# Patient Record
Sex: FEMALE | Race: WHITE | Employment: OTHER | URBAN - METROPOLITAN AREA
[De-identification: names, ages, dates, MRNs, and addresses within clinical notes are randomized per-mention and may not be internally consistent; named-entity substitution may affect disease eponyms.]

---

## 2019-06-12 ENCOUNTER — APPOINTMENT (OUTPATIENT)
Dept: GENERAL RADIOLOGY | Age: 59
End: 2019-06-12
Payer: MEDICARE

## 2019-06-12 ENCOUNTER — HOSPITAL ENCOUNTER (OUTPATIENT)
Age: 59
Setting detail: OBSERVATION
Discharge: HOME | End: 2019-06-13
Attending: EMERGENCY MEDICINE | Admitting: INTERNAL MEDICINE
Payer: MEDICARE

## 2019-06-12 DIAGNOSIS — R07.9 CHEST PAIN, UNSPECIFIED TYPE: Primary | ICD-10-CM

## 2019-06-12 DIAGNOSIS — R00.2 PALPITATIONS: ICD-10-CM

## 2019-06-12 LAB
A/G RATIO: 1.3 (ref 1.1–2.2)
ALBUMIN SERPL-MCNC: 4.3 G/DL (ref 3.4–5)
ALP BLD-CCNC: 73 U/L (ref 40–129)
ALT SERPL-CCNC: 11 U/L (ref 10–40)
ANION GAP SERPL CALCULATED.3IONS-SCNC: 11 MMOL/L (ref 3–16)
AST SERPL-CCNC: 14 U/L (ref 15–37)
BASOPHILS ABSOLUTE: 0.1 K/UL (ref 0–0.2)
BASOPHILS RELATIVE PERCENT: 0.7 %
BILIRUB SERPL-MCNC: 0.4 MG/DL (ref 0–1)
BUN BLDV-MCNC: 20 MG/DL (ref 7–20)
CALCIUM SERPL-MCNC: 9.5 MG/DL (ref 8.3–10.6)
CHLORIDE BLD-SCNC: 109 MMOL/L (ref 99–110)
CO2: 21 MMOL/L (ref 21–32)
CREAT SERPL-MCNC: 0.8 MG/DL (ref 0.6–1.1)
D DIMER: 593 NG/ML DDU (ref 0–229)
EOSINOPHILS ABSOLUTE: 0.2 K/UL (ref 0–0.6)
EOSINOPHILS RELATIVE PERCENT: 1.8 %
GFR AFRICAN AMERICAN: >60
GFR NON-AFRICAN AMERICAN: >60
GLOBULIN: 3.2 G/DL
GLUCOSE BLD-MCNC: 104 MG/DL (ref 70–99)
HCT VFR BLD CALC: 42.5 % (ref 36–48)
HEMOGLOBIN: 14.2 G/DL (ref 12–16)
LYMPHOCYTES ABSOLUTE: 3.4 K/UL (ref 1–5.1)
LYMPHOCYTES RELATIVE PERCENT: 28.9 %
MAGNESIUM: 2.7 MG/DL (ref 1.8–2.4)
MCH RBC QN AUTO: 30.9 PG (ref 26–34)
MCHC RBC AUTO-ENTMCNC: 33.5 G/DL (ref 31–36)
MCV RBC AUTO: 92.5 FL (ref 80–100)
MONOCYTES ABSOLUTE: 0.9 K/UL (ref 0–1.3)
MONOCYTES RELATIVE PERCENT: 7.5 %
NEUTROPHILS ABSOLUTE: 7.1 K/UL (ref 1.7–7.7)
NEUTROPHILS RELATIVE PERCENT: 61.1 %
PDW BLD-RTO: 13.4 % (ref 12.4–15.4)
PLATELET # BLD: 307 K/UL (ref 135–450)
PMV BLD AUTO: 7.8 FL (ref 5–10.5)
POTASSIUM REFLEX MAGNESIUM: 4.5 MMOL/L (ref 3.5–5.1)
RBC # BLD: 4.59 M/UL (ref 4–5.2)
SODIUM BLD-SCNC: 141 MMOL/L (ref 136–145)
TOTAL PROTEIN: 7.5 G/DL (ref 6.4–8.2)
TROPONIN: <0.01 NG/ML
TROPONIN: <0.01 NG/ML
WBC # BLD: 11.6 K/UL (ref 4–11)

## 2019-06-12 PROCEDURE — 36415 COLL VENOUS BLD VENIPUNCTURE: CPT

## 2019-06-12 PROCEDURE — 93005 ELECTROCARDIOGRAM TRACING: CPT | Performed by: EMERGENCY MEDICINE

## 2019-06-12 PROCEDURE — G0378 HOSPITAL OBSERVATION PER HR: HCPCS

## 2019-06-12 PROCEDURE — 99285 EMERGENCY DEPT VISIT HI MDM: CPT

## 2019-06-12 PROCEDURE — 71046 X-RAY EXAM CHEST 2 VIEWS: CPT

## 2019-06-12 PROCEDURE — 85025 COMPLETE CBC W/AUTO DIFF WBC: CPT

## 2019-06-12 PROCEDURE — 83735 ASSAY OF MAGNESIUM: CPT

## 2019-06-12 PROCEDURE — 80053 COMPREHEN METABOLIC PANEL: CPT

## 2019-06-12 PROCEDURE — 85379 FIBRIN DEGRADATION QUANT: CPT

## 2019-06-12 PROCEDURE — 6370000000 HC RX 637 (ALT 250 FOR IP): Performed by: EMERGENCY MEDICINE

## 2019-06-12 PROCEDURE — 84484 ASSAY OF TROPONIN QUANT: CPT

## 2019-06-12 RX ORDER — NITROGLYCERIN 0.4 MG/1
0.4 TABLET SUBLINGUAL EVERY 5 MIN PRN
Status: DISCONTINUED | OUTPATIENT
Start: 2019-06-12 | End: 2019-06-13 | Stop reason: HOSPADM

## 2019-06-12 RX ORDER — NAPROXEN 250 MG/1
500 TABLET ORAL 2 TIMES DAILY WITH MEALS
Status: DISCONTINUED | OUTPATIENT
Start: 2019-06-13 | End: 2019-06-12

## 2019-06-12 RX ORDER — NAPROXEN 250 MG/1
500 TABLET ORAL 2 TIMES DAILY PRN
Status: DISCONTINUED | OUTPATIENT
Start: 2019-06-12 | End: 2019-06-13 | Stop reason: HOSPADM

## 2019-06-12 RX ORDER — HYDROCHLOROTHIAZIDE 25 MG/1
25 TABLET ORAL DAILY
Status: DISCONTINUED | OUTPATIENT
Start: 2019-06-13 | End: 2019-06-13 | Stop reason: HOSPADM

## 2019-06-12 RX ORDER — TOPIRAMATE 100 MG/1
100 TABLET, FILM COATED ORAL NIGHTLY
Status: DISCONTINUED | OUTPATIENT
Start: 2019-06-12 | End: 2019-06-13 | Stop reason: HOSPADM

## 2019-06-12 RX ORDER — POTASSIUM CHLORIDE 7.45 MG/ML
10 INJECTION INTRAVENOUS PRN
Status: DISCONTINUED | OUTPATIENT
Start: 2019-06-12 | End: 2019-06-13 | Stop reason: HOSPADM

## 2019-06-12 RX ORDER — POTASSIUM CHLORIDE 750 MG/1
10 TABLET, EXTENDED RELEASE ORAL
Status: DISCONTINUED | OUTPATIENT
Start: 2019-06-13 | End: 2019-06-13 | Stop reason: HOSPADM

## 2019-06-12 RX ORDER — SODIUM CHLORIDE 0.9 % (FLUSH) 0.9 %
10 SYRINGE (ML) INJECTION PRN
Status: DISCONTINUED | OUTPATIENT
Start: 2019-06-12 | End: 2019-06-13 | Stop reason: HOSPADM

## 2019-06-12 RX ORDER — POTASSIUM CHLORIDE 20 MEQ/1
40 TABLET, EXTENDED RELEASE ORAL PRN
Status: DISCONTINUED | OUTPATIENT
Start: 2019-06-12 | End: 2019-06-13 | Stop reason: HOSPADM

## 2019-06-12 RX ORDER — ACETAMINOPHEN 325 MG/1
650 TABLET ORAL EVERY 4 HOURS PRN
Status: DISCONTINUED | OUTPATIENT
Start: 2019-06-12 | End: 2019-06-13 | Stop reason: HOSPADM

## 2019-06-12 RX ORDER — GABAPENTIN 100 MG/1
100 CAPSULE ORAL 3 TIMES DAILY
Status: DISCONTINUED | OUTPATIENT
Start: 2019-06-12 | End: 2019-06-13 | Stop reason: HOSPADM

## 2019-06-12 RX ORDER — ASPIRIN 325 MG
325 TABLET ORAL ONCE
Status: COMPLETED | OUTPATIENT
Start: 2019-06-12 | End: 2019-06-12

## 2019-06-12 RX ORDER — MECLIZINE HCL 12.5 MG/1
25 TABLET ORAL 3 TIMES DAILY
Status: DISCONTINUED | OUTPATIENT
Start: 2019-06-12 | End: 2019-06-13 | Stop reason: HOSPADM

## 2019-06-12 RX ORDER — MAGNESIUM SULFATE 1 G/100ML
1 INJECTION INTRAVENOUS PRN
Status: DISCONTINUED | OUTPATIENT
Start: 2019-06-12 | End: 2019-06-13 | Stop reason: HOSPADM

## 2019-06-12 RX ORDER — ONDANSETRON 2 MG/ML
4 INJECTION INTRAMUSCULAR; INTRAVENOUS EVERY 6 HOURS PRN
Status: DISCONTINUED | OUTPATIENT
Start: 2019-06-12 | End: 2019-06-13 | Stop reason: HOSPADM

## 2019-06-12 RX ORDER — SODIUM CHLORIDE 0.9 % (FLUSH) 0.9 %
10 SYRINGE (ML) INJECTION EVERY 12 HOURS SCHEDULED
Status: DISCONTINUED | OUTPATIENT
Start: 2019-06-12 | End: 2019-06-13 | Stop reason: HOSPADM

## 2019-06-12 RX ORDER — ATORVASTATIN CALCIUM 10 MG/1
40 TABLET, FILM COATED ORAL NIGHTLY
Status: DISCONTINUED | OUTPATIENT
Start: 2019-06-12 | End: 2019-06-13 | Stop reason: HOSPADM

## 2019-06-12 RX ORDER — PROMETHAZINE HYDROCHLORIDE 25 MG/1
25 TABLET ORAL EVERY 6 HOURS PRN
Status: DISCONTINUED | OUTPATIENT
Start: 2019-06-12 | End: 2019-06-13 | Stop reason: HOSPADM

## 2019-06-12 RX ORDER — PANTOPRAZOLE SODIUM 40 MG/1
40 TABLET, DELAYED RELEASE ORAL
Status: DISCONTINUED | OUTPATIENT
Start: 2019-06-13 | End: 2019-06-13 | Stop reason: HOSPADM

## 2019-06-12 RX ORDER — ASPIRIN 81 MG/1
81 TABLET, CHEWABLE ORAL DAILY
Status: DISCONTINUED | OUTPATIENT
Start: 2019-06-13 | End: 2019-06-13 | Stop reason: HOSPADM

## 2019-06-12 RX ADMIN — ASPIRIN 325 MG: 325 TABLET, COATED ORAL at 20:51

## 2019-06-12 ASSESSMENT — ENCOUNTER SYMPTOMS
COLOR CHANGE: 0
CHEST TIGHTNESS: 1
VOMITING: 0
SHORTNESS OF BREATH: 1
ABDOMINAL PAIN: 0
NAUSEA: 0
EYES NEGATIVE: 1
BACK PAIN: 0

## 2019-06-12 ASSESSMENT — PAIN SCALES - GENERAL: PAINLEVEL_OUTOF10: 7

## 2019-06-12 ASSESSMENT — HEART SCORE
ECG: 0
ECG: 1

## 2019-06-13 ENCOUNTER — APPOINTMENT (OUTPATIENT)
Dept: CT IMAGING | Age: 59
End: 2019-06-13
Payer: MEDICARE

## 2019-06-13 ENCOUNTER — APPOINTMENT (OUTPATIENT)
Dept: NUCLEAR MEDICINE | Age: 59
End: 2019-06-13
Payer: MEDICARE

## 2019-06-13 VITALS
OXYGEN SATURATION: 95 % | RESPIRATION RATE: 16 BRPM | BODY MASS INDEX: 24.4 KG/M2 | DIASTOLIC BLOOD PRESSURE: 80 MMHG | SYSTOLIC BLOOD PRESSURE: 138 MMHG | HEART RATE: 51 BPM | WEIGHT: 142.13 LBS | TEMPERATURE: 97.8 F

## 2019-06-13 LAB
EKG ATRIAL RATE: 52 BPM
EKG ATRIAL RATE: 65 BPM
EKG DIAGNOSIS: NORMAL
EKG DIAGNOSIS: NORMAL
EKG P AXIS: 34 DEGREES
EKG P AXIS: 57 DEGREES
EKG P-R INTERVAL: 178 MS
EKG P-R INTERVAL: 188 MS
EKG Q-T INTERVAL: 382 MS
EKG Q-T INTERVAL: 462 MS
EKG QRS DURATION: 76 MS
EKG QRS DURATION: 80 MS
EKG QTC CALCULATION (BAZETT): 397 MS
EKG QTC CALCULATION (BAZETT): 429 MS
EKG R AXIS: 24 DEGREES
EKG R AXIS: 37 DEGREES
EKG T AXIS: 24 DEGREES
EKG T AXIS: 33 DEGREES
EKG VENTRICULAR RATE: 52 BPM
EKG VENTRICULAR RATE: 65 BPM
LV EF: 65 %
LVEF MODALITY: NORMAL

## 2019-06-13 PROCEDURE — 2580000003 HC RX 258: Performed by: INTERNAL MEDICINE

## 2019-06-13 PROCEDURE — 78452 HT MUSCLE IMAGE SPECT MULT: CPT

## 2019-06-13 PROCEDURE — 3430000000 HC RX DIAGNOSTIC RADIOPHARMACEUTICAL: Performed by: INTERNAL MEDICINE

## 2019-06-13 PROCEDURE — 6360000002 HC RX W HCPCS: Performed by: INTERNAL MEDICINE

## 2019-06-13 PROCEDURE — 93005 ELECTROCARDIOGRAM TRACING: CPT | Performed by: INTERNAL MEDICINE

## 2019-06-13 PROCEDURE — 71260 CT THORAX DX C+: CPT

## 2019-06-13 PROCEDURE — G0378 HOSPITAL OBSERVATION PER HR: HCPCS

## 2019-06-13 PROCEDURE — 93017 CV STRESS TEST TRACING ONLY: CPT

## 2019-06-13 PROCEDURE — A9502 TC99M TETROFOSMIN: HCPCS | Performed by: INTERNAL MEDICINE

## 2019-06-13 PROCEDURE — 93010 ELECTROCARDIOGRAM REPORT: CPT | Performed by: INTERNAL MEDICINE

## 2019-06-13 PROCEDURE — 6360000004 HC RX CONTRAST MEDICATION: Performed by: REGISTERED NURSE

## 2019-06-13 PROCEDURE — 99204 OFFICE O/P NEW MOD 45 MIN: CPT | Performed by: INTERNAL MEDICINE

## 2019-06-13 PROCEDURE — 96372 THER/PROPH/DIAG INJ SC/IM: CPT

## 2019-06-13 PROCEDURE — 6370000000 HC RX 637 (ALT 250 FOR IP): Performed by: INTERNAL MEDICINE

## 2019-06-13 RX ADMIN — TOPIRAMATE 100 MG: 100 TABLET, FILM COATED ORAL at 00:17

## 2019-06-13 RX ADMIN — PANTOPRAZOLE SODIUM 40 MG: 40 TABLET, DELAYED RELEASE ORAL at 14:28

## 2019-06-13 RX ADMIN — TETROFOSMIN 11.3 MILLICURIE: 1.38 INJECTION, POWDER, LYOPHILIZED, FOR SOLUTION INTRAVENOUS at 11:50

## 2019-06-13 RX ADMIN — TETROFOSMIN 32.8 MILLICURIE: 1.38 INJECTION, POWDER, LYOPHILIZED, FOR SOLUTION INTRAVENOUS at 13:25

## 2019-06-13 RX ADMIN — GABAPENTIN 100 MG: 100 CAPSULE ORAL at 14:28

## 2019-06-13 RX ADMIN — ENOXAPARIN SODIUM 40 MG: 40 INJECTION SUBCUTANEOUS at 08:25

## 2019-06-13 RX ADMIN — ASPIRIN 81 MG 81 MG: 81 TABLET ORAL at 08:25

## 2019-06-13 RX ADMIN — SODIUM CHLORIDE, PRESERVATIVE FREE 10 ML: 5 INJECTION INTRAVENOUS at 00:17

## 2019-06-13 RX ADMIN — POTASSIUM CHLORIDE 10 MEQ: 750 TABLET, EXTENDED RELEASE ORAL at 08:27

## 2019-06-13 RX ADMIN — MECLIZINE 25 MG: 12.5 TABLET ORAL at 08:27

## 2019-06-13 RX ADMIN — ACETAMINOPHEN 650 MG: 325 TABLET ORAL at 04:08

## 2019-06-13 RX ADMIN — ATORVASTATIN CALCIUM 40 MG: 10 TABLET, FILM COATED ORAL at 00:16

## 2019-06-13 RX ADMIN — SODIUM CHLORIDE, PRESERVATIVE FREE 10 ML: 5 INJECTION INTRAVENOUS at 08:27

## 2019-06-13 RX ADMIN — IOPAMIDOL 75 ML: 755 INJECTION, SOLUTION INTRAVENOUS at 16:56

## 2019-06-13 RX ADMIN — GABAPENTIN 100 MG: 100 CAPSULE ORAL at 08:25

## 2019-06-13 RX ADMIN — MECLIZINE 25 MG: 12.5 TABLET ORAL at 14:28

## 2019-06-13 RX ADMIN — GABAPENTIN 100 MG: 100 CAPSULE ORAL at 00:16

## 2019-06-13 RX ADMIN — HYDROCHLOROTHIAZIDE 25 MG: 25 TABLET ORAL at 08:30

## 2019-06-13 RX ADMIN — MECLIZINE 25 MG: 12.5 TABLET ORAL at 00:17

## 2019-06-13 ASSESSMENT — PAIN SCALES - GENERAL
PAINLEVEL_OUTOF10: 4
PAINLEVEL_OUTOF10: 4

## 2019-06-13 NOTE — ED PROVIDER NOTES
[address] [facility]  ED  EMERGENCY DEPARTMENT ENCOUNTER        Pt Name: Brigitte Carrillo  MRN: [id number]  [place] 1960  Date of evaluation: 6/12/2019  Provider: Nasir Dejesus PA-C  PCP: David Miranda MD  ED Attending: Dm Rojas DO      This patient was seen by the attending provider Dm Rojas DO    History provided by the patient    CHIEF COMPLAINT:     Chief Complaint   Patient presents with    Irregular Heart Beat     pt reports history of A. Fib but is not in all the time, today reports \"feeling like I was in A. Fib every time I stood up\". Pt also reports pain in bilateral arms and legs; sent in by         HISTORY OF PRESENT ILLNESS:      Brigitte Carrillo is a 64 y.o. female who arrives to the ED by private vehicle. The patient states since getting up this morning at 10 AM she has been experiencing intermittent chest tightness as well as tightness to bilateral upper and lower extremities. She reports she feels her heart racing at times. She is never actually checked to see how fast her heart is beating. The patient symptoms seem to be exacerbated when she is up and moving around and alleviated at rest.  She did call her PCP who based on the symptoms recommended she come to the emergency department. The patient reports a history of \"arrhythmia\". Initially the triage nurse documented a history of atrial fibrillation but the patient actually does not have this. She last had a stress test in 2015 at the [facility] that was reportedly normal.  The patient is a smoker. She has a history of hypertension and is prescribed hydrochlorothiazide. She does not have any further significant past medical history. She denies any recent travel, surgery or other period of immobility. She denies leg swelling. She is not on any estrogen supplements.     Nursing Notes were reviewed     REVIEW OF SYSTEMS:     Review of Systems   Constitutional: Negative for Factors: > 3 Risk factors or history of atherosclerotic disease*  Troponin: < 1X normal limit  Heart Score Total: 5      PHYSICAL EXAM:       ED Triage Vitals [06/12/19 1849]   BP Temp Temp Source Pulse Resp SpO2 Height Weight   122/81 97.5 °F (36.4 °C) Oral 85 20 96 % -- 145 lb (65.8 kg)       Physical Exam    CONSTITUTIONAL: Awake and alert. Cooperative. Well-developed. Well-nourished. Non-toxic. No acute distress. HENT: Normocephalic. Atraumatic. External ears normal, without discharge. No nasal discharge. Oropharynx clear. Mucous membranes moist.  EYES: Conjunctiva non-injected. No scleral icterus. PERRL. EOM's grossly intact. NECK: Supple. Normal ROM. CARDIOVASCULAR: RRR. No Murmer. Intact distal pulses. PULMONARY/CHEST WALL: Effort normal. No tachypnea. Lungs clear to ausculation. ABDOMEN: Normal BS. Soft. Nondistended. No tenderness to palpate. No guarding. /ANORECTAL: Not assessed  MUSKULOSKELETAL: Normal ROM. No acute deformities. No edema. No tenderness to palpate. SKIN: Warm and dry. No rash. NEUROLOGICAL: Alert and oriented x 3. GCS 15. CN II-XII grossly intact. Strength is 5/5 in all extremities and sensation is intact. Normal gait.    PSYCHIATRIC: Normal affect        DIAGNOSTICRESULTS:     LABS:    Results for orders placed or performed during the hospital encounter of 06/12/19   CBC Auto Differential   Result Value Ref Range    WBC 11.6 (H) 4.0 - 11.0 K/uL    RBC 4.59 4.00 - 5.20 M/uL    Hemoglobin 14.2 12.0 - 16.0 g/dL    Hematocrit 42.5 36.0 - 48.0 %    MCV 92.5 80.0 - 100.0 fL    MCH 30.9 26.0 - 34.0 pg    MCHC 33.5 31.0 - 36.0 g/dL    RDW 13.4 12.4 - 15.4 %    Platelets 083 809 - 550 K/uL    MPV 7.8 5.0 - 10.5 fL    Neutrophils % 61.1 %    Lymphocytes % 28.9 %    Monocytes % 7.5 %    Eosinophils % 1.8 %    Basophils % 0.7 %    Neutrophils # 7.1 1.7 - 7.7 K/uL    Lymphocytes # 3.4 1.0 - 5.1 K/uL    Monocytes # 0.9 0.0 - 1.3 K/uL    Eosinophils # 0.2 0.0 - 0.6 K/uL    Basophils # 0.1 unspecified type    2.  Palpitations          DISPOSITION/PLAN:   DISPOSITION Decision To Admit                 (Please note thatportions of this note were completed with a voice recognition program.  Efforts were made to edit the dictations, but occasionally words are mis-transcribed.)    Alfredo Arriaga PA-C (electronicallysigned)              [place], [place]  06/12/19 3730

## 2019-06-13 NOTE — CONSULTS
[id number]    Atypical chest pain  Palpitations  Smoker  FH of CAD  Bradycardia    Stress test  30 day monitor at discharge

## 2019-06-13 NOTE — CONSULTS
[address] [place] [address]                                  CONSULTATION    PATIENT NAME: Reed Vásquez                  :        1960  MED REC NO:   [id number]                          ROOM:       5384  ACCOUNT NO:   [de-identified]                           ADMIT DATE: 2019  PROVIDER:     Maribeth Musa MD    CONSULT DATE:  2019    REASON FOR CONSULTATION:  Chest pain and palpitations. HISTORY OF PRESENT ILLNESS:  A 65-year-old female who presented to the  hospital with the above chief complaint. The symptoms started on the  day of admission although she reports having similar symptoms about a  year ago. It was intermittent for several hours, seemed to get worse  when she would stands up. Symptoms were becoming more severe throughout  the day. Symptoms were not exertional.  She further describes her chest  discomfort as a heaviness in the center of her chest that radiated into  both of her arms and legs that had the associated palpitations. Again,  it was not exertional.  It would last for minutes or longer, but would  recur multiple times throughout the day. PAST MEDICAL HISTORY:  She denies any history of diabetes, hypertension,  or hyperlipidemia. SOCIAL HISTORY:  She smokes. FAMILY HISTORY:  Positive for heart disease. REVIEW OF SYSTEMS:  She denies fevers or chills. No cough. No focal  neurologic symptoms. No headache. No visual changes. No recent GI or   bleeding. No recent upcoming surgeries. All other systems are  negative except as present illness. ALLERGIES:  See list in the chart, which I have reviewed. MEDICATIONS:  See list in the chart, which I have reviewed. PHYSICAL EXAMINATION:  VITALS:  Blood pressure is 119/74, respirations 16, heart rate 44, and  temperature is 97.8. She is 95% saturating on room air.   GENERAL:  A well-developed, well-nourished white female, in no acute  distress. HEENT:  Normocephalic and atraumatic. Oropharynx clear. Moist mucous  membranes. NECK:  Supple. CHEST:  Clear. CARDIAC:  Regular S1 and S2. There is no S3 or S4 gallop. There is no  significant murmur. Jugular venous pressure is normal.  Carotids are 2+  and symmetric without bruit. ABDOMEN:  Soft and nontender. Positive bowel sounds. EXTREMITIES:  No cyanosis or edema. NEUROLOGIC:  Grossly nonfocal.  SKIN:  Warm and dry. PSYCHIATRIC:  Affect calm. EKG, sinus rhythm, no acute ischemia or injury pattern. Troponin is  less than 0.01. Chest x-ray is without pulmonary edema. IMPRESSION:  1. Atypical chest pain. 2.  Palpitations. 3.  Smoking. 4.  Family history of coronary artery disease. 5.  Bradycardia, asymptomatic. RECOMMENDATIONS:  1. GXT Myoview stress test.  2.  A 30-day rhythm monitor at discharge. 3.  Smoking cessation was discussed.         Carmen Connelly MD    D: 06/13/2019 9:03:24       T: 06/13/2019 11:34:55     [email or web address]  Job#: [id number]     Doc#: [id number]    CC:

## 2019-06-13 NOTE — ED PROVIDER NOTES
I independently performed a history and physical on [address] [facility]. All diagnostic, treatment, and disposition decisions were made by myself in conjunction with the advanced practice provider. For further details of [address] [facility] emergency department encounter, please see advanced practice provider's documentation    This is a 72-year-old female presenting with palpitations and chest heaviness starting this morning. Symptoms come and go throughout the day. She says it feels like there is something heavy sitting on her chest.    Physical examination: Heart regular rate and rhythm. Lungs clear to auscultation bilaterally    Xr Chest Standard (2 Vw)    Result Date: 6/12/2019  EXAMINATION: TWO XRAY VIEWS OF THE CHEST 6/12/2019 7:19 pm COMPARISON: Two views of the chest 07/26/2012. HISTORY: ORDERING SYSTEM PROVIDED HISTORY: irregular HR TECHNOLOGIST PROVIDED HISTORY: Reason for exam:->irregular HR Ordering Physician Provided Reason for Exam: irregular hr Acuity: Acute Type of Exam: Initial FINDINGS: The heart size and mediastinal contours are normal.  No focal lung consolidation or evidence of pulmonary edema. There is no pleural effusion. No acute findings. The Ekg interpreted by me shows  normal sinus rhythm with a rate of 65  Axis is   Normal  QTc is  normal  Intervals and Durations are unremarkable. ST Segments: no acute change    72-year-old female with cardiac risk factors presenting with chest pain. Patient is admitted for further management. Patient's d-dimer was slightly elevated. Cristy Freedman, [address] did discuss this with the hospitalist and she wishes to defer CT chest at this time. Patient is not tachycardic or hypoxic.        Justyna Brown,   06/12/19 9363

## 2019-06-13 NOTE — PROGRESS NOTES
A GXT stress test was completed on this patient as ordered. The patient tolerated the procedure well. Awaiting stress imaging at this time.

## 2019-06-13 NOTE — PROGRESS NOTES
Patient admitted to room 450 from ED. Patient oriented to room, call light, bed rails, phone, lights and bathroom. Patient instructed about the schedule of the day including: vital sign frequency, lab draws, possible tests, frequency of MD and staff rounds, including RN/MD rounding together at bedside, daily weights, and I &O's. Patient instructed about prescribed diet, how to use 8MENU, and television. Bed alarm in place, patient aware of placement and reason. Telemetry box in place, patient aware of placement and reason. Bed locked, in lowest position, side rails up 2/4, call light within reach. Will continue to monitor. Chasidy Covington RN    11:57 PM  VSS. Patient resting in bed with no complaints. Chasidy Covington RN    4:09 AM  VSS. PRN tylenol given for pain.  Chasidy Covington RN

## 2019-06-13 NOTE — DISCHARGE SUMMARY
Hospital Medicine Discharge Summary    Patient ID: Phil Rosales      Patient's PCP: Sadia Dupont MD    Admit Date: 6/12/2019     Discharge Date: 6/13/2019     Admitting Physician: Becky Cedeno MD     Discharge Physician: MICHAEL Quintero - CNP     Discharge Diagnoses: Active Hospital Problems    Diagnosis    Chest pain [R07.9]       The patient was seen and examined on day of discharge and this discharge summary is in conjunction with any daily progress note from day of discharge. Hospital Course:   Pt is a 65 yo female who presented to [facility] ED due to chest pain associated with palpitations. No dyspnea. No fever or chills. No N/V/D. Atypical chest pain with associated palpitations:  - EKG non-ischemic in ED. Serial troponins negative. - Cardiology consulted. Recommend stress test and 30 day cardiac event monitor due to palpitations. - Stress test showed no evidence of stress induced ischemia, hyperdynamic LV systolic function EF >04% with uniform wall motion.  - D-dimer obtained in ED was elevated. CTPA therefore obtained and was negative for acute abnormality. Tobacco abuse:  - Smoking cessation discussed. Hypertension:  - Controlled. Continue home HCTZ. Incidental nodules noted on chest CT:  - Per Fleischner society guidelines recommend optional repeat chest CT in 12 months. Seizure disorder:  - Continue home topamax and gabapentin. Vertigo:  - Stable. Continue home meclizine. Physical Exam Performed:     /80   Pulse 51   Temp 97.8 °F (36.6 °C) (Oral)   Resp 16   Wt 142 lb 2 oz (64.5 kg)   SpO2 95%   BMI 24.40 kg/m²       General appearance:  No apparent distress, appears stated age and cooperative. HEENT:  Normal cephalic, atraumatic without obvious deformity. Pupils equal, round, and reactive to light. Extra ocular muscles intact. Conjunctivae/corneas clear. Neck: Supple, with full range of motion.  No jugular venous distention. Trachea midline. Respiratory:  Normal respiratory effort. Clear to auscultation, bilaterally without Rales/Wheezes/Rhonchi. Cardiovascular:  Regular rate and rhythm with normal S1/S2 without murmurs, rubs or gallops. Abdomen: Soft, non-tender, non-distended with normal bowel sounds. Musculoskeletal:  No clubbing, cyanosis or edema bilaterally. Full range of motion without deformity. Skin: Skin color, texture, turgor normal.  No rashes or lesions. Neurologic:  Neurovascularly intact without any focal sensory/motor deficits. Cranial nerves: II-XII intact, grossly non-focal.  Psychiatric:  Alert and oriented, thought content appropriate, normal insight  Capillary Refill: Brisk,< 3 seconds   Peripheral Pulses: +2 palpable, equal bilaterally       Labs: For convenience and continuity at follow-up the following most recent labs are provided:      CBC:    Lab Results   Component Value Date    WBC 11.6 06/12/2019    HGB 14.2 06/12/2019    HCT 42.5 06/12/2019     06/12/2019       Renal:    Lab Results   Component Value Date     06/12/2019    K 4.5 06/12/2019     06/12/2019    CO2 21 06/12/2019    BUN 20 06/12/2019    CREATININE 0.8 06/12/2019    CALCIUM 9.5 06/12/2019         Significant Diagnostic Studies    Radiology:   CT CHEST PULMONARY EMBOLISM W CONTRAST   Final Result   1. No acute abnormality. 2. Multiple solid subcentimeter bilateral pulmonary nodules. RECOMMENDATION:   Fleischner Society guidelines for follow-up and management of incidentally   detected pulmonary nodules:      Multiple Solid Nodules:      Nodule size less than 6 mm   In a low-risk patient, no routine follow-up. In a high-risk patient, optional CT at 12 months. NM Cardiac Stress Test Nuclear Imaging   Final Result      XR CHEST STANDARD (2 VW)   Final Result   No acute findings.                 Consults:     IP CONSULT TO HOSPITALIST  IP CONSULT TO CARDIOLOGY    Disposition: Home    Condition at Discharge: Stable    Discharge Instructions/Follow-up:  Follow-up with PCP     Code Status:  Full Code     Activity: activity as tolerated    Diet: regular diet      Discharge Medications:     Discharge Medication List as of 6/13/2019  6:24 PM           Details   naproxen (NAPROSYN) 500 MG tablet Take 1 tablet by mouth 2 times daily (with meals), Disp-30 tablet, R-0      topiramate (TOPAMAX) 100 MG tablet Take 100 mg by mouth nightly.      gabapentin (NEURONTIN) 100 MG capsule 1-3 po qhs as directed      meclizine (ANTIVERT) 25 MG tablet Take 25 mg by mouth 3 times daily. omeprazole (PRILOSEC) 20 MG capsule Take 20 mg by mouth daily. promethazine (PHENERGAN) 25 MG tablet Take 25 mg by mouth every 6 hours as needed. hydrochlorothiazide (HYDRODIURIL) 25 MG tablet Take 25 mg by mouth daily as needed. Indications: not taking regularly      potassium chloride (KLOR-CON) 10 MEQ CR tablet Take 10 mEq by mouth daily as needed. Indications: not taking regularly             Time Spent on discharge is more than 45 minutes in the examination, evaluation, counseling and review of medications and discharge plan. Signed:    MICHAEL Vega - CNP   6/13/2019      Thank you Mima Rousseau MD for the opportunity to be involved in this patient's care. If you have any questions or concerns please feel free to contact me at [phone].

## 2019-06-13 NOTE — H&P
Hospital Medicine History & Physical      PCP: Amina Sin MD    Date of Admission: 6/12/2019    Date of Service: Pt seen/examined on 6/12  And  Placed in Observation. Chief Complaint:   Chest pain      History Of Present Illness:     64 y.o. female who presented to [facility]  For evaluation of chest pain, palpitations  ( currently resolved). / Pt has multiple risk factors ( including HTN and arrhythmia and does not follow up with cardiologist )  and will be left for close monitoring of Troponins, repeating EKG , Cardiology consult in AM     Past Medical History:          Diagnosis Date    Arrhythmia     has not seen cardiologist/ no problems for years    Cavarre disease     abnormal blood vessels brain    Fluid retention in legs     Seizures (HCC)     blank stare ? last time cannot remember most things    Vertigo        Past Surgical History:          Procedure Laterality Date    BRAIN SURGERY      abnormal blood vessels leaking/ noncancerous    HYSTERECTOMY      OTHER SURGICAL HISTORY  10/18/2012    VIDEO ARTHROSCOPY RIGHT KNEE WITH PARTIAL MEDIAL MENISCECTOMY, PARTIAL LATERAL MENISCECTOMY AND CHONDROPLASTY       Medications Prior to Admission:      Prior to Admission medications    Medication Sig Start Date End Date Taking? Authorizing Provider   naproxen (NAPROSYN) 500 MG tablet Take 1 tablet by mouth 2 times daily (with meals) 6/13/16   Janki Schneider Jh, APRN - CNP   gabapentin (NEURONTIN) 100 MG capsule 1-3 po qhs as directed 3/27/15   Historical Provider, MD   meclizine (ANTIVERT) 25 MG tablet Take 25 mg by mouth 3 times daily. Historical Provider, MD   topiramate (TOPAMAX) 100 MG tablet Take 100 mg by mouth nightly. Historical Provider, MD   omeprazole (PRILOSEC) 20 MG capsule Take 20 mg by mouth daily. Historical Provider, MD   promethazine (PHENERGAN) 25 MG tablet Take 25 mg by mouth every 6 hours as needed.       Historical Provider, MD   hydrochlorothiazide (HYDRODIURIL) 25 MG tablet Take 25 mg by mouth daily as needed. Indications: not taking regularly    Historical Provider, MD   potassium chloride (KLOR-CON) 10 MEQ CR tablet Take 10 mEq by mouth daily as needed. Indications: not taking regularly    Historical Provider, MD       Allergies:  Nabumetone; Sudafed [pseudoephedrine hcl]; and Actifed cold-allergy [chlorpheniramine-phenyleph er]    Social History:      The patient currently lives  At home    TOBACCO:   reports that she has been smoking. She has a 32.00 pack-year smoking history. She has never used smokeless tobacco.  ETOH:   reports that she does not drink alcohol. Family History:      Reviewed in detail and  Positive as follows:        Problem Relation Age of Onset    Other Mother         workup for pancreatic cancer ?  Parkinsonism Maternal Aunt        REVIEW OF SYSTEMS:   All twelve systems reviewed and negative except for noted in HPI. PHYSICAL EXAM PERFORMED:    BP (!) 121/99   Pulse 59   Temp 97.5 °F (36.4 °C) (Oral)   Resp 15   Wt 145 lb (65.8 kg)   SpO2 97%   BMI 24.89 kg/m²     General appearance:  No apparent distress, appears stated age and cooperative. HEENT:  Normal cephalic, atraumatic without obvious deformity. Pupils equal, round, and reactive to light. Extra ocular muscles intact. Conjunctivae/corneas clear. Neck: Supple, with full range of motion. No jugular venous distention. Trachea midline. Respiratory:  Normal respiratory effort. Clear to auscultation, bilaterally without Rales/Wheezes/Rhonchi. Cardiovascular:  Regular rate and rhythm with normal S1/S2 without murmurs, rubs or gallops. Abdomen: Soft, non-tender, non-distended with normal bowel sounds. Musculoskeletal: No clubbing, cyanosis or edema bilaterally. Full range of motion without deformity. Peripheral Pulses: +2 palpable, equal bilaterally   Skin: Skin color, texture, turgor normal.  No rashes or lesions.   Neurologic:  Neurovascularly intact

## 2019-06-13 NOTE — DISCHARGE INSTR - COC
Continuity of Care Form    Patient Name: Fco Truong   :  1960  MRN:  [id number]    Admit date:  2019  Discharge date:  ***    Code Status Order: Full Code   Advance Directives:   [facility] Documentation     Date/Time Healthcare Directive Type of Healthcare Directive Copy in [address] Agent's Name Healthcare Agent's Phone Number    [phone]  No, patient does not have an advance directive for healthcare treatment -- -- -- -- --          Admitting Physician:  Mandi Morris MD  PCP: Eliz Salazar MD    Discharging Nurse: [facility] Unit/Room#: 7902/4295-98  Discharging Unit Phone Number: ***    Emergency Contact:   Extended Emergency Contact Information  Primary Emergency Contact: [place]  Address: [address] [place] [address] Phone: [phone]  Relation: Spouse  Secondary Emergency Contact: [facility] Phone: [phone]  Relation: Parent    Past Surgical History:  Past Surgical History:   Procedure Laterality Date    BRAIN SURGERY      abnormal blood vessels leaking/ noncancerous    HYSTERECTOMY      OTHER SURGICAL HISTORY  10/18/2012    VIDEO ARTHROSCOPY RIGHT KNEE WITH PARTIAL MEDIAL MENISCECTOMY, PARTIAL LATERAL MENISCECTOMY AND CHONDROPLASTY       Immunization History: There is no immunization history on file for this patient.     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    Chest pain R07.9       Isolation/Infection:   Isolation          No Isolation            Nurse Assessment:  Last Vital Signs: /74   Pulse (!) 44   Temp 97.8 °F (36.6 °C) (Oral)   Resp 16   Wt 142 lb 2 oz (64.5 kg)   SpO2 95%   BMI 24.40 kg/m²     Last documented pain score (0-10 scale): Pain Level: 4  Last Weight:   Wt Readings from Last 1 Encounters:   19 142 lb 2 oz (64.5 kg)     Mental Status:  {IP PT MENTAL STATUS:}    IV Access:  { EARLE IV ACCESS:127426987}    Nursing Mobility/ADLs:  Walking   {CHP DME ZQZJ:037534753}  Transfer  {CHP DME SOZR:542913229}  Bathing  {CHP DME NKB}  Dressing  {CHP DME ULNR:870911941}  Toileting  {CHP DME MTIS:874834942}  Feeding  {CHP DME VCED:022776472}  Med Admin  {CHP DME TPDN:519731530}  Med Delivery   { EARLE MED Delivery:913360647}    Wound Care Documentation and Therapy:        Elimination:  Continence:   · Bowel: {YES / VS:08900}  · Bladder: {YES / LR:82002}  Urinary Catheter: {Urinary Catheter:508996837}   Colostomy/Ileostomy/Ileal Conduit: {YES / ZD:62246}       Date of Last BM: ***    Intake/Output Summary (Last 24 hours) at 2019 0958  Last data filed at 2019 0347  Gross per 24 hour   Intake --   Output 400 ml   Net -400 ml     I/O last 3 completed shifts:  In: -   Out: 400 [Urine:400]    Safety Concerns:     [employer] Safety Concerns:609424010}    Impairments/Disabilities:      [employer] Impairments/Disabilities:660120511}    Nutrition Therapy:  Current Nutrition Therapy:   [employer] Diet List:577825896}    Routes of Feeding: {[facility] DME Other Feedings:637897061}  Liquids: {Slp liquid thickness:26651}  Daily Fluid Restriction: {CHP DME Yes amt example:095501502}  Last Modified Barium Swallow with Video (Video Swallowing Test): {Done Not Done TUTW:589111390}    Treatments at the Time of Hospital Discharge:   Respiratory Treatments: ***  Oxygen Therapy:  {Therapy; copd oxygen:64388}  Ventilator:    {[facility] Vent UEZY:150453663}    Rehab Therapies: {THERAPEUTIC INTERVENTION:0392123589}  Weight Bearing Status/Restrictions: [facility]  Weight Bearin}  Other Medical Equipment (for information only, NOT a DME order):  {EQUIPMENT:534641983}  Other Treatments: ***    Patient's personal belongings (please select all that are sent with patient):  {[facility] DME Belongings:521373656}    RN SIGNATURE:  {Esignature:398042289}    CASE MANAGEMENT/SOCIAL WORK SECTION    Inpatient Status Date: ***    Readmission Risk Assessment Score:  Readmission Risk              Risk of Unplanned Readmission:        7           Discharging to Facility/ Agency   · Name:   · Address:  · Phone:  · Fax:    Dialysis Facility (if applicable)   · Name:  · Address:  · Dialysis Schedule:  · Phone:  · Fax:    / signature: {Esignature:025633273}    PHYSICIAN SECTION    Prognosis: {Prognosis:0595525928}    Condition at Discharge: [address] [place] [address] Patient Condition:690527394}    Rehab Potential (if transferring to Rehab): {Prognosis:2903137143}    Recommended Labs or Other Treatments After Discharge: ***    Physician Certification: I certify the above information and transfer of Adelina Rodney  is necessary for the continuing treatment of the diagnosis listed and that she requires {Admit to Appropriate Level of Care:29733} for {GREATER/LESS:791322705} 30 days.      Update Admission H&P: {CHP DME Changes in NJYMJ:520555297}    PHYSICIAN SIGNATURE:  {Esignature:733561483}

## 2019-06-13 NOTE — PROGRESS NOTES
6/12/19 @ 22:45 left msg for Cardiac consult with [address] [place] [address] Cardiology.  Chirag Bahena

## 2019-07-29 DIAGNOSIS — R00.2 PALPITATIONS: ICD-10-CM

## 2019-07-29 PROCEDURE — 93228 REMOTE 30 DAY ECG REV/REPORT: CPT | Performed by: INTERNAL MEDICINE

## 2019-07-31 DIAGNOSIS — R00.2 PALPITATIONS: ICD-10-CM

## 2019-09-15 ENCOUNTER — HOSPITAL ENCOUNTER (EMERGENCY)
Age: 59
Discharge: HOME | End: 2019-09-15
Attending: EMERGENCY MEDICINE
Payer: MEDICARE

## 2019-09-15 VITALS
RESPIRATION RATE: 22 BRPM | DIASTOLIC BLOOD PRESSURE: 71 MMHG | TEMPERATURE: 98.4 F | HEIGHT: 63 IN | OXYGEN SATURATION: 96 % | WEIGHT: 142 LBS | SYSTOLIC BLOOD PRESSURE: 103 MMHG | BODY MASS INDEX: 25.16 KG/M2 | HEART RATE: 72 BPM

## 2019-09-15 DIAGNOSIS — E86.0 MILD DEHYDRATION: ICD-10-CM

## 2019-09-15 DIAGNOSIS — I95.1 ORTHOSTASIS: Primary | ICD-10-CM

## 2019-09-15 LAB
A/G RATIO: 1.5 (ref 1.1–2.2)
ALBUMIN SERPL-MCNC: 4.7 G/DL (ref 3.4–5)
ALP BLD-CCNC: 71 U/L (ref 40–129)
ALT SERPL-CCNC: 11 U/L (ref 10–40)
ANION GAP SERPL CALCULATED.3IONS-SCNC: 16 MMOL/L (ref 3–16)
AST SERPL-CCNC: 18 U/L (ref 15–37)
BASOPHILS ABSOLUTE: 0.1 K/UL (ref 0–0.2)
BASOPHILS RELATIVE PERCENT: 0.8 %
BILIRUB SERPL-MCNC: 0.4 MG/DL (ref 0–1)
BILIRUBIN URINE: NEGATIVE
BLOOD, URINE: NEGATIVE
BUN BLDV-MCNC: 22 MG/DL (ref 7–20)
CALCIUM SERPL-MCNC: 9.7 MG/DL (ref 8.3–10.6)
CHLORIDE BLD-SCNC: 109 MMOL/L (ref 99–110)
CLARITY: CLEAR
CO2: 18 MMOL/L (ref 21–32)
COLOR: ABNORMAL
CREAT SERPL-MCNC: 0.7 MG/DL (ref 0.6–1.1)
EOSINOPHILS ABSOLUTE: 0.2 K/UL (ref 0–0.6)
EOSINOPHILS RELATIVE PERCENT: 1.6 %
GFR AFRICAN AMERICAN: >60
GFR NON-AFRICAN AMERICAN: >60
GLOBULIN: 3.1 G/DL
GLUCOSE BLD-MCNC: 116 MG/DL (ref 70–99)
GLUCOSE URINE: NEGATIVE MG/DL
HCT VFR BLD CALC: 44.8 % (ref 36–48)
HEMOGLOBIN: 14.8 G/DL (ref 12–16)
KETONES, URINE: 15 MG/DL
LEUKOCYTE ESTERASE, URINE: NEGATIVE
LYMPHOCYTES ABSOLUTE: 2.9 K/UL (ref 1–5.1)
LYMPHOCYTES RELATIVE PERCENT: 23.8 %
MAGNESIUM: 2.3 MG/DL (ref 1.8–2.4)
MCH RBC QN AUTO: 30.5 PG (ref 26–34)
MCHC RBC AUTO-ENTMCNC: 33.1 G/DL (ref 31–36)
MCV RBC AUTO: 92 FL (ref 80–100)
MICROSCOPIC EXAMINATION: ABNORMAL
MONOCYTES ABSOLUTE: 1 K/UL (ref 0–1.3)
MONOCYTES RELATIVE PERCENT: 7.8 %
NEUTROPHILS ABSOLUTE: 8.1 K/UL (ref 1.7–7.7)
NEUTROPHILS RELATIVE PERCENT: 66 %
NITRITE, URINE: NEGATIVE
PDW BLD-RTO: 13.4 % (ref 12.4–15.4)
PH UA: 7.5 (ref 5–8)
PLATELET # BLD: 328 K/UL (ref 135–450)
PMV BLD AUTO: 8 FL (ref 5–10.5)
POTASSIUM SERPL-SCNC: 4.7 MMOL/L (ref 3.5–5.1)
PROTEIN UA: NEGATIVE MG/DL
RBC # BLD: 4.87 M/UL (ref 4–5.2)
SODIUM BLD-SCNC: 143 MMOL/L (ref 136–145)
SPECIFIC GRAVITY UA: 1.02 (ref 1–1.03)
TOTAL PROTEIN: 7.8 G/DL (ref 6.4–8.2)
TROPONIN: <0.01 NG/ML
URINE REFLEX TO CULTURE: ABNORMAL
URINE TYPE: ABNORMAL
UROBILINOGEN, URINE: 0.2 E.U./DL
WBC # BLD: 12.3 K/UL (ref 4–11)

## 2019-09-15 PROCEDURE — 85025 COMPLETE CBC W/AUTO DIFF WBC: CPT

## 2019-09-15 PROCEDURE — 80053 COMPREHEN METABOLIC PANEL: CPT

## 2019-09-15 PROCEDURE — 2580000003 HC RX 258: Performed by: PHYSICIAN ASSISTANT

## 2019-09-15 PROCEDURE — 81003 URINALYSIS AUTO W/O SCOPE: CPT

## 2019-09-15 PROCEDURE — 83735 ASSAY OF MAGNESIUM: CPT

## 2019-09-15 PROCEDURE — 84484 ASSAY OF TROPONIN QUANT: CPT

## 2019-09-15 PROCEDURE — 99285 EMERGENCY DEPT VISIT HI MDM: CPT

## 2019-09-15 PROCEDURE — 93005 ELECTROCARDIOGRAM TRACING: CPT | Performed by: EMERGENCY MEDICINE

## 2019-09-15 RX ORDER — 0.9 % SODIUM CHLORIDE 0.9 %
1000 INTRAVENOUS SOLUTION INTRAVENOUS ONCE
Status: COMPLETED | OUTPATIENT
Start: 2019-09-15 | End: 2019-09-15

## 2019-09-15 RX ADMIN — SODIUM CHLORIDE 1000 ML: 9 INJECTION, SOLUTION INTRAVENOUS at 16:45

## 2019-09-15 ASSESSMENT — ENCOUNTER SYMPTOMS
VOMITING: 0
COUGH: 0
COLOR CHANGE: 0
ABDOMINAL PAIN: 0
NAUSEA: 1
EYES NEGATIVE: 1
SHORTNESS OF BREATH: 0

## 2019-09-15 NOTE — ED PROVIDER NOTES
[facility]  ED  EMERGENCY DEPARTMENT ENCOUNTER        Pt Name: Cherelle Kumar  MRN: [id number]  [place] 1960  Date of evaluation: 9/15/2019  Provider: Bogdan Nguyen PA-C  PCP: Yusuf Elliott MD  ED Attending: Ariana Zelaya MD      This patient was seen by the attending provider     History provided by the patient    CHIEF COMPLAINT:     Chief Complaint   Patient presents with    Irregular Heart Beat     everytime I stand up my heart rate goes up and my legs and arms feel like they are in a vice- and they go numb. began at 1130 this morning, constant since        HISTORY OF PRESENT ILLNESS:      Cherelle Kumar is a 64 y.o. female who arrives to the ED by private vehicle. The patient states since 11:30 AM she has been experiencing concerning pain position. She states she feels like her heart races. She states she feels a tight she reports when experiencing the symptoms. She is never really felt this way before. She does report chronic vertigo that she says is a result of a prior brain surgery. This makes it that she requires a walker to ambulate. She has chronic nausea as result of it as well. Again symptoms are present are exacerbated when she goes from sitting to standing. She cannot identify alleviating factors. Nursing Notes were reviewed     REVIEW OF SYSTEMS:     Review of Systems   Constitutional: Negative for appetite change, chills and fever. HENT: Negative. Eyes: Negative. Respiratory: Negative for cough and shortness of breath. Cardiovascular: Positive for chest pain (twinges of pain) and palpitations. Gastrointestinal: Positive for nausea (chronic). Negative for abdominal pain and vomiting. Genitourinary: Negative. Musculoskeletal: Positive for myalgias (squeezing to arms and legs). Negative for gait problem. Skin: Negative for color change. Neurological: Negative for weakness and headaches.    All other systems reviewed and are negative. Exceptas noted above in the ROS, all other systems were reviewed and negative. PAST MEDICAL HISTORY:     Past Medical History:   Diagnosis Date    Arrhythmia     has not seen cardiologist/ no problems for years    Cavarre disease     abnormal blood vessels brain    Fluid retention in legs     Seizures (HCC)     blank stare ? last time cannot remember most things    Vertigo          SURGICAL HISTORY:      Past Surgical History:   Procedure Laterality Date    BRAIN SURGERY      abnormal blood vessels leaking/ noncancerous    HYSTERECTOMY      OTHER SURGICAL HISTORY  10/18/2012    VIDEO ARTHROSCOPY RIGHT KNEE WITH PARTIAL MEDIAL MENISCECTOMY, PARTIAL LATERAL MENISCECTOMY AND CHONDROPLASTY         CURRENT MEDICATIONS:       Previous Medications    GABAPENTIN (NEURONTIN) 100 MG CAPSULE    1-3 po qhs as directed    HYDROCHLOROTHIAZIDE (HYDRODIURIL) 25 MG TABLET    Take 25 mg by mouth daily as needed. Indications: not taking regularly    MECLIZINE (ANTIVERT) 25 MG TABLET    Take 25 mg by mouth 3 times daily. NAPROXEN (NAPROSYN) 500 MG TABLET    Take 1 tablet by mouth 2 times daily (with meals)    OMEPRAZOLE (PRILOSEC) 20 MG CAPSULE    Take 20 mg by mouth daily. POTASSIUM CHLORIDE (KLOR-CON) 10 MEQ CR TABLET    Take 10 mEq by mouth daily as needed. Indications: not taking regularly    PROMETHAZINE (PHENERGAN) 25 MG TABLET    Take 25 mg by mouth every 6 hours as needed. TOPIRAMATE (TOPAMAX) 100 MG TABLET    Take 100 mg by mouth nightly. ALLERGIES:    Nabumetone; Sudafed [pseudoephedrine hcl]; and Actifed cold-allergy [chlorpheniramine-phenyleph er]    FAMILY HISTORY:       Family History   Problem Relation Age of Onset    Other Mother         workup for pancreatic cancer ?     Parkinsonism Maternal Aunt           SOCIAL HISTORY:       Social History     Socioeconomic History    Marital status:      Spouse name: None    Number of children: None    Years of education: None    Highest education level: None   Occupational History    None   Social Needs    Financial resource strain: None    Food insecurity:     Worry: None     Inability: None    Transportation needs:     Medical: None     Non-medical: None   Tobacco Use    Smoking status: Current Every Day Smoker     Packs/day: 1.00     Years: 32.00     Pack years: 32.00    Smokeless tobacco: Never Used    Tobacco comment: 1 pack q 3 days    Substance and Sexual Activity    Alcohol use: No    Drug use: No    Sexual activity: None   Lifestyle    Physical activity:     Days per week: None     Minutes per session: None    Stress: None   Relationships    Social connections:     Talks on phone: None     Gets together: None     Attends Hinduism service: None     Active member of club or organization: None     Attends meetings of clubs or organizations: None     Relationship status: None    Intimate partner violence:     Fear of current or ex partner: None     Emotionally abused: None     Physically abused: None     Forced sexual activity: None   Other Topics Concern    None   Social History Narrative    None       SCREENINGS:             PHYSICAL EXAM:       ED Triage Vitals [09/15/19 1541]   BP Temp Temp Source Pulse Resp SpO2 Height Weight   115/78 98.4 °F (36.9 °C) Oral 84 16 98 % 5' 3\" (1.6 m) 142 lb (64.4 kg)       Physical Exam    CONSTITUTIONAL: Awake and alert. Cooperative. Well-developed. Well-nourished. Non-toxic. No acute distress. HENT: Normocephalic. Atraumatic. External ears normal, without discharge. No nasal discharge. Oropharynx clear. Mucous membranes moist.  EYES: Conjunctiva non-injected. No scleral icterus. PERRL. EOM's grossly intact. NECK: Supple. Normal ROM. CARDIOVASCULAR: RRR. No Murmer. Intact distal pulses. PULMONARY/CHEST WALL: Effort normal. No tachypnea. Lungs clear to ausculation. ABDOMEN: Normal BS. Soft. Nondistended. No tenderness to palpate.  No 1.80 - 2.40 mg/dL   Urinalysis Reflex to Culture   Result Value Ref Range    Color, UA Straw Straw/Yellow    Clarity, UA Clear Clear    Glucose, Ur Negative Negative mg/dL    Bilirubin Urine Negative Negative    Ketones, Urine 15 (A) Negative mg/dL    Specific Gravity, UA 1.020 1.005 - 1.030    Blood, Urine Negative Negative    pH, UA 7.5 5.0 - 8.0    Protein, UA Negative Negative mg/dL    Urobilinogen, Urine 0.2 <2.0 E.U./dL    Nitrite, Urine Negative Negative    Leukocyte Esterase, Urine Negative Negative    Microscopic Examination Not Indicated     Urine Reflex to Culture Not Indicated     Urine Type Not Specified          EKG:      See interpretation by Nolan Purcell MD.      PROCEDURES:   N/A    CRITICAL CARE TIME:       None    CONSULTS:  None      EMERGENCY DEPARTMENT COURSE and DIFFERENTIAL DIAGNOSIS/MDM:   Vitals:    Vitals:    09/15/19 1541 09/15/19 1610 09/15/19 1719   BP: 115/78 124/88 118/75   Pulse: 84 80 71   Resp: 16 18 18   Temp: 98.4 °F (36.9 °C)     TempSrc: Oral     SpO2: 98% 96% 99%   Weight: 142 lb (64.4 kg)     Height: 5' 3\" (1.6 m)         Patient was given the following medications:  Medications   0.9 % sodium chloride bolus (0 mLs Intravenous Stopped 9/15/19 1815)         Patient was evaluated by both myself and Nolan Purcell MD.  The patient is here reporting symptoms including feeling her heart racing, tightness to her extremities and nausea when she stands up. Orthostatic vital signs were checked shortly after she arrives and are positive. Specifically, supine blood pressure and pulse are 110/84 and 79. Sitting blood pressure and pulse are 111/78 and 83. However standing blood pressure and pulse are 101/82 and 110. She is given 1 L of normal saline IV with improvement. Her CBC, CMP, magnesium, troponin and urinalysis are unremarkable with the exception of some ketones in her urine. Vital signs of been stable through her time in the ED.   I think her symptoms today are a result

## 2019-09-15 NOTE — ED PROVIDER NOTES
Emergency Department Provider Note     Location: [facility]  ED  9/15/2019    I independently performed a history and physical on [address]. All diagnostic, treatment, and disposition decisions were made by myself in conjunction with the mid-level provider. Briefly, this is a 64 y.o. female here for dizziness and palpitations upon standing. ED Triage Vitals [09/15/19 1541]   BP Temp Temp Source Pulse Resp SpO2 Height Weight   115/78 98.4 °F (36.9 °C) Oral 84 16 98 % 5' 3\" (1.6 m) 142 lb (64.4 kg)        Patient resting comfortably in no acute distress. Heart is regular rate and rhythm. Lungs clear to auscultation bilaterally. Abdomen is soft, nondistended, and nontender. No peripheral edema noted. Patient seen and examined. Vital signs stable and within normal limits. Patient's orthostatic vital signs are positive. Lab work-up notable for mild leukocytosis, electrolytes within normal limits, troponin negative, UA without evidence of UTI. Patient given IV fluids with improvement in symptoms. Patient discharged home with return precautions. EKG  The Ekg interpreted by me in the absence of a cardiologist shows. normal sinus rhythm with a rate of 84  Axis is   Normal  QTc is  normal  Intervals and Durations are unremarkable. No specific ST-T wave changes appreciated. No evidence of acute ischemia. No significant change from prior EKG dated 6/13/2019      No results found.       Results for orders placed or performed during the hospital encounter of 09/15/19   CBC Auto Differential   Result Value Ref Range    WBC 12.3 (H) 4.0 - 11.0 K/uL    RBC 4.87 4.00 - 5.20 M/uL    Hemoglobin 14.8 12.0 - 16.0 g/dL    Hematocrit 44.8 36.0 - 48.0 %    MCV 92.0 80.0 - 100.0 fL    MCH 30.5 26.0 - 34.0 pg    MCHC 33.1 31.0 - 36.0 g/dL    RDW 13.4 12.4 - 15.4 %    Platelets 672 727 - 145 K/uL    MPV 8.0 5.0 - 10.5 fL    Neutrophils % 66.0 %    Lymphocytes % 23.8 %    Monocytes %

## 2019-09-15 NOTE — ED NOTES
Jus Ballard is a 63 y/o F who presented to the ED via son's POV for irregular heart rate. Pt states that when she stands up she can feel her HR become very fast and her arms and legs begin to feel like they are in a vice , then go numb. Pt reports this has happened in the past in which she was placed on a 30 day cardiac event monitor. This episode began around 1130 this morning. Pt denies numbness at this time, as well as denies pain, CP, SOB. Pt has hx of vertigo and is prescribed meclizine in which the pt states it slows down the feeling, however, does not resolve it. Pt resting in bed with cardiac monitor in place, call light within reach, and denies any needs at this time.      Alfreda Damon RN  09/15/19 1900

## 2019-09-15 NOTE — ED NOTES
Referrals and specific instructions were reviewed with the pt, and pt denies any questions at this time. Pt does not appear to be in distress and ambulated out of the ED with a walker and departed via her son's POV , with all personal belongings.        Sri Baxter RN  09/15/19 2721

## 2019-09-16 LAB
EKG ATRIAL RATE: 84 BPM
EKG DIAGNOSIS: NORMAL
EKG P AXIS: 71 DEGREES
EKG P-R INTERVAL: 174 MS
EKG Q-T INTERVAL: 356 MS
EKG QRS DURATION: 78 MS
EKG QTC CALCULATION (BAZETT): 420 MS
EKG R AXIS: 9 DEGREES
EKG T AXIS: 41 DEGREES
EKG VENTRICULAR RATE: 84 BPM

## 2019-09-16 PROCEDURE — 93010 ELECTROCARDIOGRAM REPORT: CPT | Performed by: INTERNAL MEDICINE

## 2021-04-26 ENCOUNTER — APPOINTMENT (OUTPATIENT)
Dept: GENERAL RADIOLOGY | Age: 61
End: 2021-04-26
Payer: MEDICARE

## 2021-04-26 ENCOUNTER — HOSPITAL ENCOUNTER (EMERGENCY)
Age: 61
Discharge: HOME | End: 2021-04-26
Attending: EMERGENCY MEDICINE
Payer: MEDICARE

## 2021-04-26 VITALS
BODY MASS INDEX: 22.88 KG/M2 | OXYGEN SATURATION: 97 % | SYSTOLIC BLOOD PRESSURE: 116 MMHG | HEART RATE: 75 BPM | HEIGHT: 64 IN | RESPIRATION RATE: 17 BRPM | WEIGHT: 134 LBS | DIASTOLIC BLOOD PRESSURE: 86 MMHG | TEMPERATURE: 98.8 F

## 2021-04-26 DIAGNOSIS — I47.1 PAROXYSMAL SUPRAVENTRICULAR TACHYCARDIA (HCC): Primary | ICD-10-CM

## 2021-04-26 LAB
A/G RATIO: 1.5 (ref 1.1–2.2)
ALBUMIN SERPL-MCNC: 4.4 G/DL (ref 3.4–5)
ALP BLD-CCNC: 73 U/L (ref 40–129)
ALT SERPL-CCNC: 12 U/L (ref 10–40)
ANION GAP SERPL CALCULATED.3IONS-SCNC: 16 MMOL/L (ref 3–16)
AST SERPL-CCNC: 17 U/L (ref 15–37)
BASOPHILS ABSOLUTE: 0.1 K/UL (ref 0–0.2)
BASOPHILS RELATIVE PERCENT: 0.6 %
BILIRUB SERPL-MCNC: 0.4 MG/DL (ref 0–1)
BUN BLDV-MCNC: 21 MG/DL (ref 7–20)
CALCIUM SERPL-MCNC: 9.5 MG/DL (ref 8.3–10.6)
CHLORIDE BLD-SCNC: 107 MMOL/L (ref 99–110)
CO2: 19 MMOL/L (ref 21–32)
CREAT SERPL-MCNC: 0.9 MG/DL (ref 0.6–1.1)
EOSINOPHILS ABSOLUTE: 0.1 K/UL (ref 0–0.6)
EOSINOPHILS RELATIVE PERCENT: 1.2 %
GFR AFRICAN AMERICAN: >60
GFR NON-AFRICAN AMERICAN: >60
GLOBULIN: 3 G/DL
GLUCOSE BLD-MCNC: 144 MG/DL (ref 70–99)
HCT VFR BLD CALC: 41.5 % (ref 36–48)
HEMOGLOBIN: 14.1 G/DL (ref 12–16)
LYMPHOCYTES ABSOLUTE: 3.2 K/UL (ref 1–5.1)
LYMPHOCYTES RELATIVE PERCENT: 26.8 %
MCH RBC QN AUTO: 30.7 PG (ref 26–34)
MCHC RBC AUTO-ENTMCNC: 33.9 G/DL (ref 31–36)
MCV RBC AUTO: 90.7 FL (ref 80–100)
MONOCYTES ABSOLUTE: 1 K/UL (ref 0–1.3)
MONOCYTES RELATIVE PERCENT: 8.4 %
NEUTROPHILS ABSOLUTE: 7.4 K/UL (ref 1.7–7.7)
NEUTROPHILS RELATIVE PERCENT: 63 %
PDW BLD-RTO: 13.2 % (ref 12.4–15.4)
PLATELET # BLD: 294 K/UL (ref 135–450)
PMV BLD AUTO: 7.6 FL (ref 5–10.5)
POTASSIUM REFLEX MAGNESIUM: 3.7 MMOL/L (ref 3.5–5.1)
RBC # BLD: 4.58 M/UL (ref 4–5.2)
SODIUM BLD-SCNC: 142 MMOL/L (ref 136–145)
TOTAL PROTEIN: 7.4 G/DL (ref 6.4–8.2)
TROPONIN: <0.01 NG/ML
WBC # BLD: 11.8 K/UL (ref 4–11)

## 2021-04-26 PROCEDURE — 93005 ELECTROCARDIOGRAM TRACING: CPT | Performed by: EMERGENCY MEDICINE

## 2021-04-26 PROCEDURE — 80053 COMPREHEN METABOLIC PANEL: CPT

## 2021-04-26 PROCEDURE — 85025 COMPLETE CBC W/AUTO DIFF WBC: CPT

## 2021-04-26 PROCEDURE — 71045 X-RAY EXAM CHEST 1 VIEW: CPT

## 2021-04-26 PROCEDURE — 2580000003 HC RX 258: Performed by: EMERGENCY MEDICINE

## 2021-04-26 PROCEDURE — 99284 EMERGENCY DEPT VISIT MOD MDM: CPT

## 2021-04-26 PROCEDURE — 84484 ASSAY OF TROPONIN QUANT: CPT

## 2021-04-26 PROCEDURE — 6360000002 HC RX W HCPCS

## 2021-04-26 RX ORDER — ADENOSINE 3 MG/ML
INJECTION, SOLUTION INTRAVENOUS
Status: COMPLETED
Start: 2021-04-26 | End: 2021-04-26

## 2021-04-26 RX ORDER — 0.9 % SODIUM CHLORIDE 0.9 %
1000 INTRAVENOUS SOLUTION INTRAVENOUS ONCE
Status: COMPLETED | OUTPATIENT
Start: 2021-04-26 | End: 2021-04-26

## 2021-04-26 RX ADMIN — SODIUM CHLORIDE 1000 ML: 9 INJECTION, SOLUTION INTRAVENOUS at 21:00

## 2021-04-26 RX ADMIN — ADENOSINE 6 MG: 3 INJECTION, SOLUTION INTRAVENOUS at 21:07

## 2021-04-26 ASSESSMENT — PAIN SCALES - GENERAL: PAINLEVEL_OUTOF10: 3

## 2021-04-27 LAB
EKG ATRIAL RATE: 107 BPM
EKG ATRIAL RATE: 144 BPM
EKG ATRIAL RATE: 75 BPM
EKG DIAGNOSIS: NORMAL
EKG P AXIS: 63 DEGREES
EKG P AXIS: 72 DEGREES
EKG P-R INTERVAL: 156 MS
EKG P-R INTERVAL: 188 MS
EKG Q-T INTERVAL: 262 MS
EKG Q-T INTERVAL: 340 MS
EKG Q-T INTERVAL: 390 MS
EKG QRS DURATION: 66 MS
EKG QRS DURATION: 74 MS
EKG QRS DURATION: 74 MS
EKG QTC CALCULATION (BAZETT): 435 MS
EKG QTC CALCULATION (BAZETT): 448 MS
EKG QTC CALCULATION (BAZETT): 453 MS
EKG R AXIS: 20 DEGREES
EKG R AXIS: 44 DEGREES
EKG R AXIS: 6 DEGREES
EKG T AXIS: -66 DEGREES
EKG T AXIS: 13 DEGREES
EKG T AXIS: 21 DEGREES
EKG VENTRICULAR RATE: 107 BPM
EKG VENTRICULAR RATE: 176 BPM
EKG VENTRICULAR RATE: 75 BPM

## 2021-04-27 PROCEDURE — 93010 ELECTROCARDIOGRAM REPORT: CPT | Performed by: INTERNAL MEDICINE

## 2021-04-27 NOTE — ED NOTES
Pt Ambulated well, Pulse started at 97, while ambulating pulse was 107.  RN Aware      Mario Marmolejo  04/26/21 8984

## 2021-04-27 NOTE — ED PROVIDER NOTES
tablet, R-0      gabapentin (NEURONTIN) 100 MG capsule 1-3 po qhs as directed      meclizine (ANTIVERT) 25 MG tablet Take 25 mg by mouth 3 times daily. topiramate (TOPAMAX) 100 MG tablet Take 100 mg by mouth nightly. omeprazole (PRILOSEC) 20 MG capsule Take 20 mg by mouth daily. promethazine (PHENERGAN) 25 MG tablet Take 25 mg by mouth every 6 hours as needed. hydrochlorothiazide (HYDRODIURIL) 25 MG tablet Take 25 mg by mouth daily as needed. Indications: not taking regularly      potassium chloride (KLOR-CON) 10 MEQ CR tablet Take 10 mEq by mouth daily as needed. Indications: not taking regularly             ALLERGIES     Nabumetone, Sudafed [pseudoephedrine hcl], and Actifed cold-allergy [chlorpheniramine-phenyleph er]    FAMILY HISTORY       Family History   Problem Relation Age of Onset    Other Mother         workup for pancreatic cancer ?     Parkinsonism Maternal Aunt           SOCIAL HISTORY       Social History     Socioeconomic History    Marital status:      Spouse name: None    Number of children: None    Years of education: None    Highest education level: None   Occupational History    None   Social Needs    Financial resource strain: None    Food insecurity     Worry: None     Inability: None    Transportation needs     Medical: None     Non-medical: None   Tobacco Use    Smoking status: Current Every Day Smoker     Packs/day: 1.00     Years: 32.00     Pack years: 32.00     Types: Cigarettes    Smokeless tobacco: Never Used    Tobacco comment: 1 pack q 3 days    Substance and Sexual Activity    Alcohol use: Yes     Comment: occassionally    Drug use: Yes     Types: Marijuana    Sexual activity: None   Lifestyle    Physical activity     Days per week: None     Minutes per session: None    Stress: None   Relationships    Social connections     Talks on phone: None     Gets together: None     Attends Episcopal service: None     Active member of club or organization: None     Attends meetings of clubs or organizations: None     Relationship status: None    Intimate partner violence     Fear of current or ex partner: None     Emotionally abused: None     Physically abused: None     Forced sexual activity: None   Other Topics Concern    None   Social History Narrative    None       SCREENINGS    [place] Coma Scale  Eye Opening: Spontaneous  Best Verbal Response: Oriented  Best Motor Response: Obeys commands  [place] Coma Scale Score: 15        PHYSICAL EXAM    (up to 7 for level 4, 8 or more for level 5)     ED Triage Vitals [04/26/21 2057]   BP Temp Temp Source Pulse Resp SpO2 Height Weight   (!) 120/105 98.8 °F (37.1 °C) Oral 176 20 96 % 5' 4\" (1.626 m) 134 lb (60.8 kg)       Physical Exam  Vitals signs and nursing note reviewed. Constitutional:       Appearance: Normal appearance. She is well-developed. She is not ill-appearing. Comments: Uncomfortable appearing adult female   HENT:      Head: Normocephalic and atraumatic. Right Ear: External ear normal.      Left Ear: External ear normal.      Nose: Nose normal.   Eyes:      General: No scleral icterus. Right eye: No discharge. Left eye: No discharge. Conjunctiva/sclera: Conjunctivae normal.   Neck:      Musculoskeletal: Neck supple. Cardiovascular:      Rate and Rhythm: Regular rhythm. Tachycardia present. Heart sounds: Normal heart sounds. Pulmonary:      Effort: Pulmonary effort is normal. No respiratory distress. Breath sounds: Normal breath sounds. No wheezing or rales. Abdominal:      General: Bowel sounds are normal.   Skin:     Coloration: Skin is not pale. Neurological:      Mental Status: She is alert.    Psychiatric:         Mood and Affect: Mood normal.         Behavior: Behavior normal.           DIAGNOSTIC RESULTS     EKG: All EKG's are interpreted by the Emergency Department Physician who either signs or Co-signs this chart in the absence of a cardiologist.    12 lead EKG shows supraventricular tachycardia rate of 176 bpm, QRS QTC normal.  There appears to be diffuse inferior pattern ST depression. Post cardioversion EKG shows normal sinus rhythm at a rate of 75 bpm, NC interval QRS QTC normal.  Normal axis no acute ischemic findings. RADIOLOGY:   Non-plain film images such as CT, Ultrasound and MRI are read by the radiologist. Plain radiographic images are visualized and preliminarily interpreted by the emergency physician with the below findings:      Interpretation per the Radiologist below, if available at the time of this note:    XR CHEST PORTABLE   Final Result   No radiographic evidence of acute pulmonary disease. ED BEDSIDE ULTRASOUND:   Performed by ED Physician - none    LABS:  Labs Reviewed   CBC WITH AUTO DIFFERENTIAL - Abnormal; Notable for the following components:       Result Value    WBC 11.8 (*)     All other components within normal limits    Narrative:     Performed at:  [address], [facility] [employer]   Phone [phone]   COMPREHENSIVE METABOLIC PANEL W/ REFLEX TO MG FOR LOW K - Abnormal; Notable for the following components:    CO2 19 (*)     Glucose 144 (*)     BUN 21 (*)     All other components within normal limits    Narrative:     Performed at:  [address], [facility] [employer]   Phone [phone]   TROPONIN    Narrative:     Performed at:  [address], [facility] [employer]   Phone [phone]       All other labs were within normal range or not returned as of this dictation.     EMERGENCY DEPARTMENT COURSE and DIFFERENTIAL DIAGNOSIS/MDM:   Vitals:    Vitals:    04/26/21 2150 04/26/21 2217 04/26/21 2231 04/26/21 2246   BP:  114/75  116/86   Pulse: 78 71 70 75   Resp: 15 14 15 17   Temp:       TempSrc:       SpO2: 99% 97% 98% 97%   Weight: Height:           Adult female who comes in for palpitations lightheadedness and shortness of breath. Patient was brought back to medical resuscitation room. She was placed on cardiac blood pressure and pulse oximetry monitoring. EKG immediately obtained in triage showed what appears to be SVT. Cardiac monitor is interpreted myself shows narrow complex tachycardia. Laboratory studies chest x-ray and EKG ordered. Cardioversion Procedure Note    Indication: supraventricular tachycardia    Consent: The patient provided verbal consent for this procedure. Pre-Medication: none    Procedure: The patient was placed in the supine position. The patient is chemically cardioverted with 6 mg of adenosine. A single dose of adenosine was successful. Attempt #2: Not necessary    Attempt #3: Not necessary    The patient tolerated the procedure well. Complications: None    Patient was given a liter bolus of normal saline. Laboratory studies showed no acute process. Chest x-ray is also negative for any acute process. Post cardioversion EKG shows no acute process. The patient is reassessed his symptoms have resolved. She has a scheduled appointment in 2 days with her primary care provider. She will also be given cardiology referral.  Careful return instructions are discussed. The patient does ambulate with assistance and is able to ambulate in the emergency room at her baseline. Patient is kept on cardiac with pressure and pulse oximetry monitoring following cardioversion    Cardiac monitor as interpreted myself now shows normal sinus rhythm. CRITICAL CARE TIME   Total Critical Care time was 30 minutes, excluding separately reportable procedures. There was a high probability of clinically significant/life threatening deterioration in the patient's condition which required my urgent intervention.         CONSULTS:  None    PROCEDURES:       Procedures    FINAL IMPRESSION      1. Paroxysmal supraventricular tachycardia [facility])          DISPOSITION/PLAN   DISPOSITION Decision To Discharge 04/26/2021 11:01:31 PM      PATIENT REFERREDTO:    Keep your scheduled appointment with your PCP          DISCHARGEMEDICATIONS:  Discharge Medication List as of 4/26/2021 11:11 PM             (Please note that portions of this note were completed with a voice recognition program.  Efforts were made to edit the dictations but occasionally words are mis-transcribed.)    Beata Palomo MD (electronically signed)  Attending Emergency Physician        Beata Palomo MD  04/27/21 2877       Beata Palomo MD  04/27/21 9664

## 2021-07-23 ENCOUNTER — APPOINTMENT (OUTPATIENT)
Dept: CT IMAGING | Age: 61
End: 2021-07-23
Payer: MEDICARE

## 2021-07-23 ENCOUNTER — HOSPITAL ENCOUNTER (EMERGENCY)
Age: 61
Discharge: HOME | End: 2021-07-24
Attending: EMERGENCY MEDICINE
Payer: MEDICARE

## 2021-07-23 DIAGNOSIS — K80.20 CALCULUS OF GALLBLADDER WITHOUT CHOLECYSTITIS WITHOUT OBSTRUCTION: ICD-10-CM

## 2021-07-23 DIAGNOSIS — R10.9 FLANK PAIN: Primary | ICD-10-CM

## 2021-07-23 LAB
A/G RATIO: 1.3 (ref 1.1–2.2)
ALBUMIN SERPL-MCNC: 4.2 G/DL (ref 3.4–5)
ALP BLD-CCNC: 69 U/L (ref 40–129)
ALT SERPL-CCNC: 12 U/L (ref 10–40)
ANION GAP SERPL CALCULATED.3IONS-SCNC: 12 MMOL/L (ref 3–16)
AST SERPL-CCNC: 15 U/L (ref 15–37)
BASOPHILS ABSOLUTE: 0.1 K/UL (ref 0–0.2)
BASOPHILS RELATIVE PERCENT: 0.6 %
BILIRUB SERPL-MCNC: 0.5 MG/DL (ref 0–1)
BILIRUBIN URINE: NEGATIVE
BLOOD, URINE: NEGATIVE
BUN BLDV-MCNC: 24 MG/DL (ref 7–20)
CALCIUM SERPL-MCNC: 9 MG/DL (ref 8.3–10.6)
CHLORIDE BLD-SCNC: 105 MMOL/L (ref 99–110)
CLARITY: ABNORMAL
CO2: 20 MMOL/L (ref 21–32)
COLOR: YELLOW
CREAT SERPL-MCNC: 0.8 MG/DL (ref 0.6–1.1)
EOSINOPHILS ABSOLUTE: 0.3 K/UL (ref 0–0.6)
EOSINOPHILS RELATIVE PERCENT: 2.4 %
GFR AFRICAN AMERICAN: >60
GFR NON-AFRICAN AMERICAN: >60
GLOBULIN: 3.3 G/DL
GLUCOSE BLD-MCNC: 103 MG/DL (ref 70–99)
GLUCOSE URINE: NEGATIVE MG/DL
HCT VFR BLD CALC: 39.3 % (ref 36–48)
HEMOGLOBIN: 13.4 G/DL (ref 12–16)
KETONES, URINE: NEGATIVE MG/DL
LEUKOCYTE ESTERASE, URINE: NEGATIVE
LYMPHOCYTES ABSOLUTE: 2.4 K/UL (ref 1–5.1)
LYMPHOCYTES RELATIVE PERCENT: 21.3 %
MCH RBC QN AUTO: 31 PG (ref 26–34)
MCHC RBC AUTO-ENTMCNC: 34.2 G/DL (ref 31–36)
MCV RBC AUTO: 90.6 FL (ref 80–100)
MICROSCOPIC EXAMINATION: ABNORMAL
MONOCYTES ABSOLUTE: 1 K/UL (ref 0–1.3)
MONOCYTES RELATIVE PERCENT: 8.7 %
NEUTROPHILS ABSOLUTE: 7.5 K/UL (ref 1.7–7.7)
NEUTROPHILS RELATIVE PERCENT: 67 %
NITRITE, URINE: NEGATIVE
PDW BLD-RTO: 13.8 % (ref 12.4–15.4)
PH UA: 6.5 (ref 5–8)
PLATELET # BLD: 290 K/UL (ref 135–450)
PMV BLD AUTO: 7.3 FL (ref 5–10.5)
POTASSIUM REFLEX MAGNESIUM: 4.2 MMOL/L (ref 3.5–5.1)
PROTEIN UA: NEGATIVE MG/DL
RBC # BLD: 4.34 M/UL (ref 4–5.2)
SODIUM BLD-SCNC: 137 MMOL/L (ref 136–145)
SPECIFIC GRAVITY UA: 1.02 (ref 1–1.03)
TOTAL PROTEIN: 7.5 G/DL (ref 6.4–8.2)
URINE TYPE: ABNORMAL
UROBILINOGEN, URINE: 0.2 E.U./DL
WBC # BLD: 11.2 K/UL (ref 4–11)

## 2021-07-23 PROCEDURE — 80053 COMPREHEN METABOLIC PANEL: CPT

## 2021-07-23 PROCEDURE — 74177 CT ABD & PELVIS W/CONTRAST: CPT

## 2021-07-23 PROCEDURE — 6360000002 HC RX W HCPCS: Performed by: EMERGENCY MEDICINE

## 2021-07-23 PROCEDURE — 81003 URINALYSIS AUTO W/O SCOPE: CPT

## 2021-07-23 PROCEDURE — 96374 THER/PROPH/DIAG INJ IV PUSH: CPT

## 2021-07-23 PROCEDURE — 6360000004 HC RX CONTRAST MEDICATION: Performed by: EMERGENCY MEDICINE

## 2021-07-23 PROCEDURE — 85025 COMPLETE CBC W/AUTO DIFF WBC: CPT

## 2021-07-23 PROCEDURE — 96375 TX/PRO/DX INJ NEW DRUG ADDON: CPT

## 2021-07-23 PROCEDURE — 99284 EMERGENCY DEPT VISIT MOD MDM: CPT

## 2021-07-23 RX ORDER — ONDANSETRON 2 MG/ML
4 INJECTION INTRAMUSCULAR; INTRAVENOUS ONCE
Status: COMPLETED | OUTPATIENT
Start: 2021-07-23 | End: 2021-07-23

## 2021-07-23 RX ORDER — MORPHINE SULFATE 4 MG/ML
4 INJECTION, SOLUTION INTRAMUSCULAR; INTRAVENOUS ONCE
Status: COMPLETED | OUTPATIENT
Start: 2021-07-23 | End: 2021-07-23

## 2021-07-23 RX ADMIN — ONDANSETRON 4 MG: 2 INJECTION INTRAMUSCULAR; INTRAVENOUS at 22:47

## 2021-07-23 RX ADMIN — IOPAMIDOL 75 ML: 755 INJECTION, SOLUTION INTRAVENOUS at 23:13

## 2021-07-23 RX ADMIN — MORPHINE SULFATE 4 MG: 4 INJECTION, SOLUTION INTRAMUSCULAR; INTRAVENOUS at 22:47

## 2021-07-23 ASSESSMENT — PAIN SCALES - GENERAL
PAINLEVEL_OUTOF10: 3
PAINLEVEL_OUTOF10: 9
PAINLEVEL_OUTOF10: 9

## 2021-07-23 ASSESSMENT — PAIN DESCRIPTION - LOCATION: LOCATION: BACK;ABDOMEN

## 2021-07-24 ENCOUNTER — APPOINTMENT (OUTPATIENT)
Dept: ULTRASOUND IMAGING | Age: 61
End: 2021-07-24
Payer: MEDICARE

## 2021-07-24 VITALS
DIASTOLIC BLOOD PRESSURE: 79 MMHG | HEART RATE: 54 BPM | BODY MASS INDEX: 23.39 KG/M2 | TEMPERATURE: 98.5 F | OXYGEN SATURATION: 100 % | HEIGHT: 64 IN | RESPIRATION RATE: 17 BRPM | SYSTOLIC BLOOD PRESSURE: 116 MMHG | WEIGHT: 137 LBS

## 2021-07-24 PROCEDURE — 76705 ECHO EXAM OF ABDOMEN: CPT

## 2021-07-24 NOTE — ED NOTES
All discharge paperwork and follow-up instructions reviewed with pt. Pt verbalized understanding.  Pt ambulatory upon discharge in stable condition with home walker to private vehicle with Son.       Giovani Whitaker RN  07/24/21 0476

## 2021-07-24 NOTE — ED NOTES
Returned from ultrasound inquiring about plan of care, informed awaiting Read by radiology.       Kris Tam RN  07/24/21 1206

## 2021-07-24 NOTE — ED NOTES
Resting in bed reporting improvement in pain denied needs awaiting disposition      Lakisha Luciano RN  07/24/21 0045

## 2021-07-24 NOTE — ED PROVIDER NOTES
[facility] Emergency Department      CHIEF COMPLAINT  Flank Pain (Pt arrives via walk in with c/o right sided back pain that extends around to her pelvic area. Denies burning with urination. Patient just finished abx for uti. )      HISTORY OF PRESENT ILLNESS  Chu Patel is a 63 y.o. female with a history of seizures and brain AVM's presents with right flank pain that radiates around to her RLQ of her abd. No fevers. Has been there for several days. She did just complete abx for a UTI. No vomiting or diarrhea. She has had some nausea. No urinary symptoms. .   No other complaints, modifying factors or associated symptoms. I have reviewed the following from the nursing documentation. Past Medical History:   Diagnosis Date    Arrhythmia     has not seen cardiologist/ no problems for years    Cavarre disease     abnormal blood vessels brain    Fluid retention in legs     Seizures (HCC)     blank stare ? last time cannot remember most things    Vertigo      Past Surgical History:   Procedure Laterality Date    BRAIN SURGERY      abnormal blood vessels leaking/ noncancerous    HYSTERECTOMY      OTHER SURGICAL HISTORY  10/18/2012    VIDEO ARTHROSCOPY RIGHT KNEE WITH PARTIAL MEDIAL MENISCECTOMY, PARTIAL LATERAL MENISCECTOMY AND CHONDROPLASTY     Family History   Problem Relation Age of Onset    Other Mother         workup for pancreatic cancer ?  Parkinsonism Maternal Aunt      Social History     Socioeconomic History    Marital status:      Spouse name: Not on file    Number of children: Not on file    Years of education: Not on file    Highest education level: Not on file   Occupational History    Not on file   Tobacco Use    Smoking status: Current Every Day Smoker     Packs/day: 1.00     Years: 32.00     Pack years: 32.00     Types: Cigarettes    Smokeless tobacco: Never Used    Tobacco comment: 1 pack q 3 days    Substance and Sexual Activity    Alcohol use:  Yes Comment: occassionally    Drug use: Yes     Types: Marijuana    Sexual activity: Not on file   Other Topics Concern    Not on file   Social History Narrative    Not on file     Social Determinants of Health     Financial Resource Strain:     Difficulty of Paying Living Expenses:    Food Insecurity:     Worried About Running Out of Food in the Last Year:     [address] in the Last Year:    Transportation Needs:     Lack of Transportation (Medical):  Lack of Transportation (Non-Medical):    Physical Activity:     Days of Exercise per Week:     Minutes of Exercise per Session:    Stress:     Feeling of Stress :    Social Connections:     Frequency of Communication with Friends and Family:     Frequency of Social Gatherings with Friends and Family:     Attends Islam Services:     Active Member of Clubs or Organizations:     Attends Club or Organization Meetings:     Marital Status:    Intimate Partner Violence:     Fear of Current or Ex-Partner:     Emotionally Abused:     Physically Abused:     Sexually Abused:      No current facility-administered medications for this encounter. Current Outpatient Medications   Medication Sig Dispense Refill    naproxen (NAPROSYN) 500 MG tablet Take 1 tablet by mouth 2 times daily (with meals) 30 tablet 0    gabapentin (NEURONTIN) 100 MG capsule 1-3 po qhs as directed      meclizine (ANTIVERT) 25 MG tablet Take 25 mg by mouth 3 times daily.  topiramate (TOPAMAX) 100 MG tablet Take 100 mg by mouth nightly.  omeprazole (PRILOSEC) 20 MG capsule Take 20 mg by mouth daily.  promethazine (PHENERGAN) 25 MG tablet Take 25 mg by mouth every 6 hours as needed.  hydrochlorothiazide (HYDRODIURIL) 25 MG tablet Take 25 mg by mouth daily as needed. Indications: not taking regularly      potassium chloride (KLOR-CON) 10 MEQ CR tablet Take 10 mEq by mouth daily as needed.  Indications: not taking regularly       Allergies   Allergen Reactions    Nabumetone Other (See Comments)     jittery  jittery      Sudafed [Pseudoephedrine Hcl]      actafed-arrythmias    Actifed Cold-Allergy [Chlorpheniramine-Phenyleph Er] Palpitations       REVIEW OF SYSTEMS  10 systems reviewed, pertinent positives per HPI otherwise noted to be negative. PHYSICAL EXAM  /79   Pulse 54   Temp 98.5 °F (36.9 °C) (Oral)   Resp 17   Ht 5' 4\" (1.626 m)   Wt 137 lb (62.1 kg)   SpO2 100%   BMI 23.52 kg/m²   GENERAL APPEARANCE: Awake and alert. Cooperative. No acute distress. HEAD: Normocephalic. Atraumatic. EYES: PERRL. EOM's grossly intact. ENT: Mucous membranes are moist.   NECK: Supple, trachea midline. HEART: RRR. LUNGS: Respirations unlabored. CTAB. Good air exchange. No wheezes, rales, or rhonchi. Speaking comfortably in full sentences. ABDOMEN: Soft. Non-distended. Mild RLQ ttp, no RUQ ttp. No guarding or rebound. No CVA ttp. EXTREMITIES: No peripheral edema. MAEE. No acute deformities. SKIN: Warm, dry and intact. No acute rashes. NEUROLOGICAL: Alert and oriented X 3. CN II-XII grossly intact. Strength 5/5, sensation intact. Normal coordination. PSYCHIATRIC: Normal mood and affect. LABS  I have reviewed all labs for this visit.    Results for orders placed or performed during the hospital encounter of 07/23/21   CBC auto differential   Result Value Ref Range    WBC 11.2 (H) 4.0 - 11.0 K/uL    RBC 4.34 4.00 - 5.20 M/uL    Hemoglobin 13.4 12.0 - 16.0 g/dL    Hematocrit 39.3 36.0 - 48.0 %    MCV 90.6 80.0 - 100.0 fL    MCH 31.0 26.0 - 34.0 pg    MCHC 34.2 31.0 - 36.0 g/dL    RDW 13.8 12.4 - 15.4 %    Platelets 731 179 - 937 K/uL    MPV 7.3 5.0 - 10.5 fL    Neutrophils % 67.0 %    Lymphocytes % 21.3 %    Monocytes % 8.7 %    Eosinophils % 2.4 %    Basophils % 0.6 %    Neutrophils Absolute 7.5 1.7 - 7.7 K/uL    Lymphocytes Absolute 2.4 1.0 - 5.1 K/uL    Monocytes Absolute 1.0 0.0 - 1.3 K/uL    Eosinophils Absolute 0.3 0.0 - 0.6 K/uL with hypodensity along the endometrial canal   which is of uncertain etiology. Recommend ultrasound follow-up.      7 mm calcified granuloma left lower lobe and some hazy bibasilar opacities   posteriorly. Suggest follow-up with chest x-rays. Rechecks: Physical assessment performed. She is much more comfortable after pain meds, no return of pain. Updated on imaging and labs. She just found out her 's cousin dies and she is anxious to get home to her . ED COURSE/MDM  Patient seen and evaluated. Here the patient is afebrile with normal vitals signs. Old records reviewed. She looks well here, nontoxic. Labs wnl, normal LFT's, normal bilirubin, no UTI. Slight leukocytosis, nonspecific. CT with no kidney stone, several incidental findings, such as uterine abnormality and patient informed of this. Is cholelithiasis on CT. US done and is cholelithiasis and reported positive muprhy's sign on US, but on my exam she forbes snot have RUQ ttp. Given her normal labs, I have low suspicion for cholecystitis. Also pain free now. May have muscle train causing her pain. I think she is appropriate for outpatient follow up. Will refer to PCP. Labs and imaging reviewed and results discussed with patient. Patient was reassessed as noted above . Plan of care discussed with patient. Patient in agreement with plan. Strict return precautions have been given. Patient was given scripts for the following medications. I counseled patient how to take these medications. Discharge Medication List as of 7/24/2021  4:19 AM          CLINICAL IMPRESSION  1. Flank pain    2. Calculus of gallbladder without cholecystitis without obstruction        Blood pressure 116/79, pulse 54, temperature 98.5 °F (36.9 °C), temperature source Oral, resp. rate 17, height 5' 4\" (1.626 m), weight 137 lb (62.1 kg), SpO2 100 %. [id number] was discharged to home in stable condition.     (Please note this

## 2025-07-11 ENCOUNTER — HOSPITAL ENCOUNTER (EMERGENCY)
Age: 65
Discharge: HOME | End: 2025-07-12
Payer: MEDICARE

## 2025-07-11 DIAGNOSIS — R06.02 SHORTNESS OF BREATH: Primary | ICD-10-CM

## 2025-07-11 PROCEDURE — 93005 ELECTROCARDIOGRAM TRACING: CPT

## 2025-07-11 PROCEDURE — 99285 EMERGENCY DEPT VISIT HI MDM: CPT

## 2025-07-12 ENCOUNTER — APPOINTMENT (OUTPATIENT)
Dept: GENERAL RADIOLOGY | Age: 65
End: 2025-07-12
Payer: MEDICARE

## 2025-07-12 VITALS
OXYGEN SATURATION: 95 % | HEART RATE: 61 BPM | DIASTOLIC BLOOD PRESSURE: 52 MMHG | RESPIRATION RATE: 16 BRPM | TEMPERATURE: 97.8 F | SYSTOLIC BLOOD PRESSURE: 94 MMHG

## 2025-07-12 LAB
ALBUMIN SERPL-MCNC: 3.9 G/DL (ref 3.4–5)
ALBUMIN/GLOB SERPL: 2 {RATIO} (ref 1.1–2.2)
ALP SERPL-CCNC: 67 U/L (ref 40–129)
ALT SERPL-CCNC: 14 U/L (ref 10–40)
ANION GAP SERPL CALCULATED.3IONS-SCNC: 12 MMOL/L (ref 3–16)
AST SERPL-CCNC: 18 U/L (ref 15–37)
BASOPHILS # BLD: 0.1 K/UL (ref 0–0.2)
BASOPHILS NFR BLD: 0.9 %
BILIRUB SERPL-MCNC: <0.2 MG/DL (ref 0–1)
BUN SERPL-MCNC: 19 MG/DL (ref 7–20)
CALCIUM SERPL-MCNC: 8.8 MG/DL (ref 8.3–10.6)
CHLORIDE SERPL-SCNC: 108 MMOL/L (ref 99–110)
CO2 SERPL-SCNC: 23 MMOL/L (ref 21–32)
CREAT SERPL-MCNC: 0.8 MG/DL (ref 0.6–1.2)
DEPRECATED RDW RBC AUTO: 13.4 % (ref 12.4–15.4)
EKG ATRIAL RATE: 60 BPM
EKG DIAGNOSIS: NORMAL
EKG P AXIS: 38 DEGREES
EKG P-R INTERVAL: 174 MS
EKG Q-T INTERVAL: 420 MS
EKG QRS DURATION: 76 MS
EKG QTC CALCULATION (BAZETT): 420 MS
EKG R AXIS: -2 DEGREES
EKG T AXIS: 3 DEGREES
EKG VENTRICULAR RATE: 60 BPM
EOSINOPHIL # BLD: 0.3 K/UL (ref 0–0.6)
EOSINOPHIL NFR BLD: 2.9 %
GFR SERPLBLD CREATININE-BSD FMLA CKD-EPI: 83 ML/MIN/{1.73_M2}
GLUCOSE SERPL-MCNC: 98 MG/DL (ref 70–99)
HCT VFR BLD AUTO: 33 % (ref 36–48)
HGB BLD-MCNC: 11.1 G/DL (ref 12–16)
LYMPHOCYTES # BLD: 3.4 K/UL (ref 1–5.1)
LYMPHOCYTES NFR BLD: 35.4 %
MCH RBC QN AUTO: 31.4 PG (ref 26–34)
MCHC RBC AUTO-ENTMCNC: 33.7 G/DL (ref 31–36)
MCV RBC AUTO: 93 FL (ref 80–100)
MONOCYTES # BLD: 0.8 K/UL (ref 0–1.3)
MONOCYTES NFR BLD: 8.3 %
NEUTROPHILS # BLD: 5.1 K/UL (ref 1.7–7.7)
NEUTROPHILS NFR BLD: 52.5 %
PLATELET # BLD AUTO: 277 K/UL (ref 135–450)
PMV BLD AUTO: 7.2 FL (ref 5–10.5)
POTASSIUM SERPL-SCNC: 4 MMOL/L (ref 3.5–5.1)
PROT SERPL-MCNC: 5.9 G/DL (ref 6.4–8.2)
RBC # BLD AUTO: 3.55 M/UL (ref 4–5.2)
SODIUM SERPL-SCNC: 143 MMOL/L (ref 136–145)
TROPONIN, HIGH SENSITIVITY: 7 NG/L (ref 0–14)
TROPONIN, HIGH SENSITIVITY: 8 NG/L (ref 0–14)
WBC # BLD AUTO: 9.7 K/UL (ref 4–11)

## 2025-07-12 PROCEDURE — 71045 X-RAY EXAM CHEST 1 VIEW: CPT

## 2025-07-12 PROCEDURE — 93010 ELECTROCARDIOGRAM REPORT: CPT | Performed by: INTERNAL MEDICINE

## 2025-07-12 PROCEDURE — 94640 AIRWAY INHALATION TREATMENT: CPT

## 2025-07-12 PROCEDURE — 80053 COMPREHEN METABOLIC PANEL: CPT

## 2025-07-12 PROCEDURE — 85025 COMPLETE CBC W/AUTO DIFF WBC: CPT

## 2025-07-12 PROCEDURE — 6370000000 HC RX 637 (ALT 250 FOR IP)

## 2025-07-12 PROCEDURE — 84484 ASSAY OF TROPONIN QUANT: CPT

## 2025-07-12 RX ORDER — IPRATROPIUM BROMIDE AND ALBUTEROL SULFATE 2.5; .5 MG/3ML; MG/3ML
1 SOLUTION RESPIRATORY (INHALATION) ONCE
Status: COMPLETED | OUTPATIENT
Start: 2025-07-12 | End: 2025-07-12

## 2025-07-12 RX ADMIN — IPRATROPIUM BROMIDE AND ALBUTEROL SULFATE 1 DOSE: .5; 2.5 SOLUTION RESPIRATORY (INHALATION) at 00:13

## 2025-07-12 NOTE — ED PROVIDER NOTES
I was asked for an EKG interpretation.  Otherwise, I did not evaluate the patient.  I was available for consultation.    EKG  The Ekg interpreted by me in the absence of a cardiologist shows.  normal sinus rhythm with a rate of 60  Axis is   Normal  QTc is  normal  Intervals and Durations are unremarkable.      No specific ST-T wave changes appreciated.  No evidence of acute ischemia.   Compared to prior EKG dated April 26, 2021, patient is no longer tachycardic but otherwise no significant changes.  Previous EKG shows sinus tachycardia with a heart rate of 107.     Korey Kong MD  07/12/25 0207

## 2025-07-12 NOTE — ED PROVIDER NOTES
.          [facility] EMERGENCY DEPARTMENT  Emergency Department Encounter    Patient Name: Monica Aranda  MRN: [id number]  YOB: 1960  Date of Evaluation: 7/11/2025  Provider: No primary care provider on file.  Note Started: 1:03 AM EDT 7/12/25    CHIEF COMPLAINT  Shortness of Breath    SHARED SERVICE VISIT  KEREN. I have evaluated this patient.      HISTORY OF PRESENT ILLNESS  History From: Patient.    Limitations to history : None    Monica Aranda is a 65 y.o. female who presents to the ED for evaluation of shortness of breath onset approximately 1 hour prior to arrival.  Patient states that she was doing well up until an hour ago when she started feeling short of breath.  States that she feels like she is breathing \"thick air \".  States that she does vape and uses marijuana.  Denies any lightheadedness or dizziness.  Denies any chest pain or back pain.  Denies any nausea or vomiting.  Denies any lower extremity swelling.  Denies any history of DVT or PE.  Denies any fevers or chills or cough.    No other complaints, modifying factors or associated symptoms.     Nursing notes reviewed were all reviewed and agreed with or any disagreements were addressed in the HPI.    PMH:  Past Medical History:   Diagnosis Date    Arrhythmia     has not seen cardiologist/ no problems for years    Cavarre disease     abnormal blood vessels brain    Fluid retention in legs     Seizures (HCC)     blank stare ? last time cannot remember most things    Vertigo      Surgical History:  Past Surgical History:   Procedure Laterality Date    BRAIN SURGERY      abnormal blood vessels leaking/ noncancerous    HYSTERECTOMY      OTHER SURGICAL HISTORY  10/18/2012    VIDEO ARTHROSCOPY RIGHT KNEE WITH PARTIAL MEDIAL MENISCECTOMY, PARTIAL LATERAL MENISCECTOMY AND CHONDROPLASTY     Family History:  Family History   Problem Relation Age of Onset    Other Mother         workup for pancreatic cancer ?    Parkinsonism Maternal       General: No focal deficit present.      Mental Status: She is alert. Mental status is at baseline.   Psychiatric:         Mood and Affect: Mood normal.         Behavior: Behavior normal.         EKG  When ordered, EKG's are interpreted by the Cardiology and ED physician.  Please see their note for interpretation of EKG.    LABS  Labs Reviewed   CBC WITH AUTO DIFFERENTIAL - Abnormal; Notable for the following components:       Result Value    RBC 3.55 (*)     Hemoglobin 11.1 (*)     Hematocrit 33.0 (*)     All other components within normal limits   COMPREHENSIVE METABOLIC PANEL W/ REFLEX TO MG FOR LOW K - Abnormal; Notable for the following components:    Total Protein 5.9 (*)     All other components within normal limits   TROPONIN   TROPONIN     When ordered, only abnormal lab results are displayed.  All other labs were within normal range or not returned as of this dictation.     RADIOLOGY  Non-plain film images such as CT, U/S, and MRI are read by the radiologist.  Plain radiographic images are visualized and preliminarily interpreted by the ED Provider with the below findings:     Interpretation per the Radiologist below, if available at the time of this note:  XR CHEST PORTABLE   Final Result      1. Negative portable chest.      Electronically signed by Buster Brumfield MD        PROCEDURES  Unless otherwise noted below, none.    ED COURSE/DDx/MDM  Vitals:  Vitals:    07/11/25 2350 07/12/25 0013 07/12/25 0121   BP: 107/69  (!) 92/51   Pulse: 65  56   Resp: 18     Temp: 97.8 °F (36.6 °C)     SpO2: 97% 96% 97%     Patient received following medications in ED:  Medications   ipratropium 0.5 mg-albuterol 2.5 mg (DUONEB) nebulizer solution 1 Dose (1 Dose Inhalation Given 7/12/25 0013)       Chronic conditions affecting care:    has a past medical history of Arrhythmia, Cavarre disease, Fluid retention in legs, Seizures (HCC), and Vertigo.      Reassessment:   ED Course as of 07/12/25 0214   Sat Jul 12, 2025